# Patient Record
Sex: FEMALE | Race: BLACK OR AFRICAN AMERICAN | NOT HISPANIC OR LATINO | Employment: FULL TIME | URBAN - METROPOLITAN AREA
[De-identification: names, ages, dates, MRNs, and addresses within clinical notes are randomized per-mention and may not be internally consistent; named-entity substitution may affect disease eponyms.]

---

## 2017-03-29 ENCOUNTER — OFFICE VISIT (OUTPATIENT)
Dept: INTERNAL MEDICINE | Facility: CLINIC | Age: 37
End: 2017-03-29
Payer: COMMERCIAL

## 2017-03-29 VITALS
HEART RATE: 73 BPM | WEIGHT: 166.5 LBS | BODY MASS INDEX: 26.13 KG/M2 | TEMPERATURE: 98.2 F | SYSTOLIC BLOOD PRESSURE: 98 MMHG | DIASTOLIC BLOOD PRESSURE: 64 MMHG | HEIGHT: 67 IN | OXYGEN SATURATION: 99 %

## 2017-03-29 DIAGNOSIS — K64.4 EXTERNAL HEMORRHOIDS: Primary | ICD-10-CM

## 2017-03-29 PROCEDURE — 99213 OFFICE O/P EST LOW 20 MIN: CPT | Performed by: PHYSICIAN ASSISTANT

## 2017-03-29 NOTE — MR AVS SNAPSHOT
After Visit Summary   3/29/2017    Racquel Romeo    MRN: [id number]           Patient Information     Date Of Birth          1980        Visit Information        Provider Department      3/29/2017 10:15 AM Blanquita Carvalho PA-C; [employer] [facility]        Today's Diagnoses     External hemorrhoids    -  1      Care Instructions    [facility] [place] [phone]        Follow-ups after your visit        Additional Services     COLORECTAL SURGERY REFERRAL       Your provider has referred you to: [facility] [place] [phone]   [email or web address]    Referral Reason(s): Hemorrhoids  Special Concerns: None  This referral is: Urgent (24 - 72 hours)  It is OK to leave a message on patient's voicemail.    Please be aware that coverage of these services is subject to the terms and limitations of your health insurance plan.  Call member services at your health plan with any benefit or coverage questions.      Please bring the following with you to your appointment:    (1) Any X-Rays, CTs or MRIs which have been performed.  Contact the facility where they were done to arrange for  prior to your scheduled appointment.    (2) List of current medications  (3) This referral request   (4) Any documents/labs given to you for this referral                  Your next 10 appointments already scheduled     Apr 25, 2017 10:30 AM CDT   PHYSICAL with Benji Arias MD   [facility]    [address] [address] [phone]              Who to contact     If you have questions or need follow up information about today's clinic visit or your schedule please contact [facility] directly at [phone].  Normal or non-critical lab and imaging results will be communicated to you by  "MyChart, letter or phone within 4 business days after the clinic has received the results. If you do not hear from us within 7 days, please contact the clinic through [employer] or phone. If you have a critical or abnormal lab result, we will notify you by phone as soon as possible.  Submit refill requests through [employer] or call your pharmacy and they will forward the refill request to us. Please allow 3 business days for your refill to be completed.          Additional Information About Your Visit        [employer] Information     [employer] lets you send messages to your doctor, view your test results, renew your prescriptions, schedule appointments and more. To sign up, go to [email or web address] . Click on \"Log in\" on the left side of the screen, which will take you to the Welcome page. Then click on \"Sign up Now\" on the right side of the page.     You will be asked to enter the access code listed below, as well as some personal information. Please follow the directions to create your username and password.     Your access code is: [email or web address]  Expires: 2017 10:45 AM     Your access code will  in 90 days. If you need help or a new code, please call your [facility] clinic or [phone].        Care EveryWhere ID     This is your Care EveryWhere ID. This could be used by other organizations to access your [facility] medical records  [id number]        Your Vitals Were     Pulse Temperature Height Last Period Pulse Oximetry Breastfeeding?    73 98.2  F (36.8  C) (Oral) 5' 7\" (1.702 m) 2017 (Approximate) 99% No    BMI (Body Mass Index)                   26.08 kg/m2            Blood Pressure from Last 3 Encounters:   17 98/64   16 98/60   12/10/15 104/76    Weight from Last 3 Encounters:   17 166 lb 8 oz (75.5 kg)   16 169 lb 9.6 oz (76.9 kg)   12/10/15 169 lb 6.4 oz (76.8 kg)              We Performed the Following     COLORECTAL SURGERY REFERRAL          Today's Medication " Changes          These changes are accurate as of: 3/29/17 10:45 AM.  If you have any questions, ask your nurse or doctor.               Start taking these medicines.        Dose/Directions    hydrocortisone 2.5 % cream   Commonly known as:  ANUSOL-HC   Used for:  External hemorrhoids   Started by:  Blanquita Carvalho PA-C        Place rectally 2 times daily   Quantity:  30 g   Refills:  0            Where to get your medicines      These medications were sent to [address]  [address] [place] [address]     Phone:  [phone]     hydrocortisone 2.5 % cream                Primary Care Provider Office Phone # Fax #    Benji Arias [phone]       [facility] [address] [facility] [address]        Thank you!     Thank you for choosing [facility]  for your care. Our goal is always to provide you with excellent care. Hearing back from our patients is one way we can continue to improve our services. Please take a few minutes to complete the written survey that you may receive in the mail after your visit with us. Thank you!             Your Updated Medication List - Protect others around you: Learn how to safely use, store and throw away your medicines at www.disposemymeds.org.          This list is accurate as of: 3/29/17 10:45 AM.  Always use your most recent med list.                   Brand Name Dispense Instructions for use    Calcium Carb-Cholecalciferol 1000-800 MG-UNIT Tabs    CALCIUM 1000 + D    100 tablet    Take 1 tablet by mouth daily TAKE WITH FOOD, FOR BONE HEALTH AND LOW VITAMIN D ([facility])       cholecalciferol 38698 UNITS capsule    VITAMIN D3    40 capsule    TAKE ONE CAP EVERY OTHER WEEK, FOR VITAMIN D DEFICIENCY (1ST AND 15TH OF EACH MONTH) hold for one month, and restart in March of 2014.       ferrous sulfate Dried 160 (50 FE) MG tablet    SLOW  FE    100 tablet    Take 1 tablet by mouth 2 times daily (before meals) IRON SUPPLEMENT - PLEASE TAKE WITH 4 OZ OF OJ WITH PULP, SUBSTITUTION OF IRON SUPPLEMENT PERMITTED (SEPARATE FROM CALCIUM SUPPLEMENT BY AT LEAST 3 HRS)       hydrocortisone 2.5 % cream    ANUSOL-HC    30 g    Place rectally 2 times daily       ibuprofen 800 MG tablet    ADVIL/MOTRIN    20 tablet    Take 1 tablet by mouth every 8 hours as needed for pain.       levonorgestrel 20 MCG/24HR IUD    MIRENA     1 each by Intrauterine route once       norgestimate-ethinyl estradiol 0.25-35 MG-MCG per tablet    ORTHO-CYCLEN, SPRINTEC    84 tablet    Take 1 tablet by mouth daily       prenatal multivitamin  plus iron 27-0.8 MG Tabs per tablet     100 tablet    Take 1 tablet by mouth daily INDICATION: VITAMIN SUPPLEMENTATION

## 2017-03-29 NOTE — NURSING NOTE
"Chief Complaint   Patient presents with     Hemorrhoids       Initial BP 98/64  Pulse 73  Temp 98.2  F (36.8  C) (Oral)  Ht 5' 7\" (1.702 m)  Wt 166 lb 8 oz (75.5 kg)  LMP 03/01/2017 (Approximate)  SpO2 99%  Breastfeeding? No  BMI 26.08 kg/m2 Estimated body mass index is 26.08 kg/(m^2) as calculated from the following:    Height as of this encounter: 5' 7\" (1.702 m).    Weight as of this encounter: 166 lb 8 oz (75.5 kg).  Medication Reconciliation: complete   Rosalba Larose CMA      "

## 2017-03-29 NOTE — PROGRESS NOTES
"  SUBJECTIVE:                                                    Racquel Romeo is a 37 year old female who presents to clinic today for the following health issues:      Hemorrhoids      Duration: Chronic- worsening 2-3 weeks ago     Description:   Pain: YES   Itching: YES    Accompanying signs and symptoms:   Blood in stool: no   Changes in stool pattern: no     History (similar episodes/previous evaluation): None    Precipitating or alleviating factors: None    Therapies tried and outcome: no over the counter treatments used.     Has been doing sitz baths.   Would like to see someone for possible removal as she is having issues chronically.       -------------------------------------    Problem list and histories reviewed & adjusted, as indicated.  Additional history: as documented    Labs reviewed in EPIC    Reviewed and updated as needed this visit by clinical staff  Tobacco  Allergies  Med Hx  Surg Hx  Fam Hx  Soc Hx      Reviewed and updated as needed this visit by Provider         ROS:  Constitutional, , cardiovascular, pulmonary, gi and gu systems are negative, except as otherwise noted.    OBJECTIVE:                                                    BP 98/64  Pulse 73  Temp 98.2  F (36.8  C) (Oral)  Ht 5' 7\" (1.702 m)  Wt 166 lb 8 oz (75.5 kg)  LMP 03/01/2017 (Approximate)  SpO2 99%  Breastfeeding? No  BMI 26.08 kg/m2  Body mass index is 26.08 kg/(m^2).  GENERAL: healthy, alert and no distress  RESP: lungs clear to auscultation - no rales, rhonchi or wheezes  CV: regular rate and rhythm, normal S1 S2, no S3 or S4, no murmur, click or rub, no peripheral edema and peripheral pulses strong  ABDOMEN: soft, nontender, no hepatosplenomegaly, no masses and bowel sounds normal  RECTAL (female): normal sphincter tone, no rectal masses and large external hemorrhoid- no thrombosis     Diagnostic Test Results:  none      ASSESSMENT/PLAN:                                                            1. " External hemorrhoids    - hydrocortisone (ANUSOL-HC) 2.5 % cream; Place rectally 2 times daily  Dispense: 30 g; Refill: 0  - COLORECTAL SURGERY REFERRAL    See Patient Instructions    Blanquita Carvalho PA-C  [facility]

## 2017-08-05 ENCOUNTER — HEALTH MAINTENANCE LETTER (OUTPATIENT)
Age: 37
End: 2017-08-05

## 2017-10-13 ENCOUNTER — OFFICE VISIT (OUTPATIENT)
Dept: OBGYN | Facility: CLINIC | Age: 37
End: 2017-10-13
Payer: COMMERCIAL

## 2017-10-13 VITALS
DIASTOLIC BLOOD PRESSURE: 60 MMHG | BODY MASS INDEX: 24.26 KG/M2 | WEIGHT: 163.8 LBS | HEART RATE: 64 BPM | HEIGHT: 69 IN | SYSTOLIC BLOOD PRESSURE: 92 MMHG

## 2017-10-13 DIAGNOSIS — Z13.220 SCREENING CHOLESTEROL LEVEL: ICD-10-CM

## 2017-10-13 DIAGNOSIS — R42 DIZZINESS: ICD-10-CM

## 2017-10-13 DIAGNOSIS — Z11.3 SCREEN FOR STD (SEXUALLY TRANSMITTED DISEASE): ICD-10-CM

## 2017-10-13 DIAGNOSIS — Z00.00 ROUTINE GENERAL MEDICAL EXAMINATION AT A HEALTH CARE FACILITY: Primary | ICD-10-CM

## 2017-10-13 DIAGNOSIS — Z12.4 SCREENING FOR CERVICAL CANCER: ICD-10-CM

## 2017-10-13 LAB
BASOPHILS # BLD AUTO: 0 10E9/L (ref 0–0.2)
BASOPHILS NFR BLD AUTO: 0.2 %
DIFFERENTIAL METHOD BLD: NORMAL
EOSINOPHIL # BLD AUTO: 0.1 10E9/L (ref 0–0.7)
EOSINOPHIL NFR BLD AUTO: 1.6 %
ERYTHROCYTE [DISTWIDTH] IN BLOOD BY AUTOMATED COUNT: 13.4 % (ref 10–15)
HCT VFR BLD AUTO: 39 % (ref 35–47)
HGB BLD-MCNC: 12.5 G/DL (ref 11.7–15.7)
LYMPHOCYTES # BLD AUTO: 1.6 10E9/L (ref 0.8–5.3)
LYMPHOCYTES NFR BLD AUTO: 27.6 %
MCH RBC QN AUTO: 27.5 PG (ref 26.5–33)
MCHC RBC AUTO-ENTMCNC: 32.1 G/DL (ref 31.5–36.5)
MCV RBC AUTO: 86 FL (ref 78–100)
MONOCYTES # BLD AUTO: 0.5 10E9/L (ref 0–1.3)
MONOCYTES NFR BLD AUTO: 8.5 %
NEUTROPHILS # BLD AUTO: 3.5 10E9/L (ref 1.6–8.3)
NEUTROPHILS NFR BLD AUTO: 62.1 %
PLATELET # BLD AUTO: 242 10E9/L (ref 150–450)
RBC # BLD AUTO: 4.54 10E12/L (ref 3.8–5.2)
WBC # BLD AUTO: 5.6 10E9/L (ref 4–11)

## 2017-10-13 PROCEDURE — 85025 COMPLETE CBC W/AUTO DIFF WBC: CPT | Performed by: ADVANCED PRACTICE MIDWIFE

## 2017-10-13 PROCEDURE — 99395 PREV VISIT EST AGE 18-39: CPT | Performed by: ADVANCED PRACTICE MIDWIFE

## 2017-10-13 PROCEDURE — 82728 ASSAY OF FERRITIN: CPT | Performed by: ADVANCED PRACTICE MIDWIFE

## 2017-10-13 PROCEDURE — 87591 N.GONORRHOEAE DNA AMP PROB: CPT | Performed by: ADVANCED PRACTICE MIDWIFE

## 2017-10-13 PROCEDURE — 83540 ASSAY OF IRON: CPT | Performed by: ADVANCED PRACTICE MIDWIFE

## 2017-10-13 PROCEDURE — 36415 COLL VENOUS BLD VENIPUNCTURE: CPT | Performed by: ADVANCED PRACTICE MIDWIFE

## 2017-10-13 PROCEDURE — 80061 LIPID PANEL: CPT | Performed by: ADVANCED PRACTICE MIDWIFE

## 2017-10-13 PROCEDURE — 83550 IRON BINDING TEST: CPT | Performed by: ADVANCED PRACTICE MIDWIFE

## 2017-10-13 PROCEDURE — G0145 SCR C/V CYTO,THINLAYER,RESCR: HCPCS | Performed by: ADVANCED PRACTICE MIDWIFE

## 2017-10-13 PROCEDURE — 82306 VITAMIN D 25 HYDROXY: CPT | Performed by: ADVANCED PRACTICE MIDWIFE

## 2017-10-13 PROCEDURE — 87491 CHLMYD TRACH DNA AMP PROBE: CPT | Performed by: ADVANCED PRACTICE MIDWIFE

## 2017-10-13 PROCEDURE — 99213 OFFICE O/P EST LOW 20 MIN: CPT | Mod: 25 | Performed by: ADVANCED PRACTICE MIDWIFE

## 2017-10-13 NOTE — LETTER
October 20, 2017      Racquel Romeo  [address]      [place] [address]    Dear ,      I am happy to inform you that your recent cervical cancer screening test (PAP smear) was normal.      Preventative screenings such as this help to ensure your health for years to come. You should repeat a pap smear in 3 years, unless otherwise directed.      You will still need to return to the clinic every year for your annual exam and other preventive tests.     Please contact the clinic at [phone] if you have further questions.       Sincerely,      LEE ANN Conley CNM/amor

## 2017-10-13 NOTE — PROGRESS NOTES
"Racquel is a 37 year old  female who presents for annual exam.     Besides routine health maintenance,  she would like to discuss dizziness.  HPI:  Racquel is here for annual well woman exam.  Would like to discuss dizziness.  States that she becomes dizzy with walking and occasionally short of breath.  Resolve when she sits down.  States onset of dizziness when going from sitting to standing. Denies syncope, heart palpitations, chest pain, changes in vision.  Has been seen for this issue by PCP and was diagnosed with iron deficiency anemia.  Was prescribed iron and vitamin D.  Patient states she took the vitamins for a while and had a significant improvement in symptoms however stopped taking because she says the medication gave her headaches.  Is not currently taking any vitamins.  Would like a refill of vitamins at a \"lower dose\" to see if this will help with associated headaches.   Menses are regular q 28-30 days and normal lasting 5 days.   Menses flow: normal.    Patient's last menstrual period was 10/05/2017 (approximate)..   Using none for contraception.    She is not currently considering pregnancy.    REPRODUCTIVE/GYNECOLOGIC HISTORY:  Racquel is not sexually active.  STI testing offered?  Accepted  History of abnormal Pap smear:  No  SOCIAL HISTORY  Abuse: does not report having previously been physical or sexually abused.    Do you feel safe in your environment? YES    She  reports that she has never smoked. She has never used smokeless tobacco.        Last PHQ-9 score on record = PHQ-9 SCORE 2012   Total Score 0     Last GAD7 score on record = No flowsheet data found.  Alcohol Score = no    HEALTH MAINTENANCE:  Cholesterol: (  Cholesterol   Date Value Ref Range Status   2013 166 0 - 200 mg/dL Final     Comment:     LDL Cholesterol is the primary guide to therapy.   The NCEP recommends further evaluation of: patients with cholesterol greater   than 200 mg/dL if additional risk factors are " present, cholesterol greater   than   240 mg/dL, triglycerides greater than 150 mg/dL, or HDL less than 40 mg/dL.   2013 225 (H) 0 - 200 mg/dL Final     Comment:     LDL Cholesterol is the primary guide to therapy.   The NCEP recommends further evaluation of: patients with cholesterol greater   than 200 mg/dL if additional risk factors are present, cholesterol greater   than   240 mg/dL, triglycerides greater than 150 mg/dL, or HDL less than 40 mg/dL.    History of abnormal lipids: No  Mammo: no . History of abnormal Mammo: Not applicable.  Regular Self Breast Exams: Yes  TSH: (  TSH   Date Value Ref Range Status   12/10/2015 1.90 0.40 - 4.00 mU/L Final    )  Pap; (  Lab Results   Component Value Date    PAP NIL 2011    PAP NIL 2009    )  Immunizations up to date: unsure   (Gardasil, Tdap, Flu)  Health maintenance updated:  yes    HEALTHY HABITS  Eating habits: eats regular meals  Calcium/Vitamin D intake: source:  dairy, dietary supplement(s) Adequate?   Exercise: How often do you exercise? No  Have you had an eye exam in the last two years? NO (Recommended)  Do you routinely see the dentist once or twice yearly? YES      HISTORY:  Obstetric History       T5      L5     SAB0   TAB0   Ectopic0   Multiple0   Live Births5       # Outcome Date GA Lbr Alli/2nd Weight Sex Delivery Anes PTL Lv   6 Term 12 39w4d 05:32 / 00:09 7 lb 3 oz (3.26 kg) M Vag-Spont Local N RICH      Name: AGUEDA,CONNOR GALARZAUMO      Apgar1:  9                Apgar5: 9   5 Term 11 40w0d 01:00 7 lb (3.175 kg) M  None  RICH      Name: Agueda   4 AB 01/01/10 4w0d          3 Term 01 40w0d 01:00 4 lb (1.814 kg) M  None  RICH      Name: Abner   2 Term 99 40w0d 01:00 4 lb (1.814 kg) M  None  RICH      Name: Jericho   1 Term 10/10/98 40w0d 01:00 5 lb (2.268 kg) M  None  RICH      Name: Daphney        Past Medical History:   Diagnosis Date     Iron deficiency anemia      IUD (intrauterine  "device) in place     Mirena  4-30-13     Past Surgical History:   Procedure Laterality Date     NO HISTORY OF SURGERY       Family History   Problem Relation Age of Onset     Family History Negative Other      Social History     Social History     Marital status:      Spouse name: Kerrie     Number of children: 4     Years of education: N/A     Occupational History      None      Social History Main Topics     Smoking status: Never Smoker     Smokeless tobacco: Never Used     Alcohol use No     Drug use: No     Sexual activity: Yes     Partners: Male     Other Topics Concern     Blood Transfusions No     Seat Belt Yes     Social History Narrative    Living arrangements - the patient lives with her family.        Current Outpatient Prescriptions:      ibuprofen (ADVIL,MOTRIN) 800 MG tablet, Take 1 tablet by mouth every 8 hours as needed for pain., Disp: 20 tablet, Rfl: 0     Allergies   Allergen Reactions     No Known Drug Allergies        Past medical, surgical, social and family history were reviewed and updated in EPIC.    ROS:   C: NEGATIVE for fever, chills, change in weight  I: NEGATIVE for worrisome rashes, moles or lesions  E: NEGATIVE for vision changes or irritation  ENT: NEGATIVE for ear, mouth and throat problems  R: NEGATIVE for significant cough or SOB  B: NEGATIVE for masses, tenderness or discharge  CV: NEGATIVE for chest pain, palpitations or peripheral edema  GI: NEGATIVE for nausea, abdominal pain, heartburn, or change in bowel habits  : NEGATIVE for unusual urinary or vaginal symptoms. Periods are regular.  M: NEGATIVE for significant arthralgias or myalgia  N: NEGATIVE for weakness, dizziness or paresthesias  P: NEGATIVE for changes in mood or affect    PHYSICAL EXAM:  BP 92/60  Pulse 64  Ht 5' 8.5\" (1.74 m)  Wt 163 lb 12.8 oz (74.3 kg)  LMP 10/05/2017 (Approximate)  Breastfeeding? No  BMI 24.54 kg/m2   BMI: Body mass index is 24.54 kg/(m^2).  Constitutional: healthy, alert and " no distress  Neck: symmetrical, thyroid normal size, no masses present, no lymphadenopathy present.   Cardiovascular: RRR, no murmurs present  Respiratory: breathing unlabored, lungs CTA bilaterally  Breast:normal without masses, tenderness or nipple discharge and no palpable axillary masses or adenopathy  Gastrointestinal: abdomen soft, non-tender, bowel sounds present  PELVIC EXAM:  Vulva: No lesions, no adenopathy, BUS WNL  Vagina: Moist, pink, discharge normal  well rugated, no lesions  Cervix:smooth, pink, no visible lesions  Uterus: Normal size, non-tender, mobile  Ovaries: No masses palpated  Rectal exam: deferred    ASSESSMENT/PLAN:  (R42) Dizziness  (primary encounter diagnosis)  Comment: Patient has hx of iron deficiency anemia   Plan: CBC with platelets and differential, Vitamin D         Deficiency, Iron and iron binding capacity,         Ferritin        Will review results and provide appropriate follow up PRN    Strongly encouraged patient to follow up with PCP pending lab results.  Discussed importance of taking vitamins to help with dizziness.  If dizziness not explained by labs today or if dizziness does not improve with iron supplementation, patient needs to follow up with PCP to rule out other possible causes.     (Z00.00) Routine general medical examination at a health care facility  Comment: normal exam for age   Plan: return to clinic PRN or in 1 year     (Z12.4) Screening for cervical cancer  Comment: pending  Plan: PAP imaged thin layer screen        Will review results and provide appropriate follow up PRN      (Z11.3) Screen for STD (sexually transmitted disease)  Comment: pending   Plan: Neisseria gonorrhoeae PCR, Chlamydia         trachomatis PCR        Will review results and provide appropriate follow up PRN      (Z13.220) Screening cholesterol level  Comment: pending   Plan: **Lipid panel reflex to direct LDL FUTURE 1yr        Will review results and provide appropriate follow up PRN         Return to clinic 1 year for well woman exam.       COUNSELING:   Reviewed preventive health counseling, as reflected in patient instructions       Healthy diet/nutrition   reports that she has never smoked. She has never used smokeless tobacco.        LEE ANN Kellogg, NEGRITOM

## 2017-10-13 NOTE — NURSING NOTE
"Chief Complaint   Patient presents with     Gyn Exam     pap due       Initial BP 92/60  Pulse 64  Ht 5' 8.5\" (1.74 m)  Wt 163 lb 12.8 oz (74.3 kg)  LMP 10/05/2017 (Approximate)  Breastfeeding? No  BMI 24.54 kg/m2 Estimated body mass index is 24.54 kg/(m^2) as calculated from the following:    Height as of this encounter: 5' 8.5\" (1.74 m).    Weight as of this encounter: 163 lb 12.8 oz (74.3 kg).  BP completed using cuff size: regular        The following HM Due: NONE      The following patient reported/Care Every where data was sent to:  P ABSTRACT QUALITY INITIATIVES [[id number]]  NA     patient has appointment for today    Emily JEFFRIES               "

## 2017-10-13 NOTE — MR AVS SNAPSHOT
"              After Visit Summary   10/13/2017    Racquel Romeo    MRN: [id number]           Patient Information     Date Of Birth          1980        Visit Information        Provider Department      10/13/2017 8:45 AM Vanessa Breaux APRN CNM; [employer] [facility]        Today's Diagnoses     Routine general medical examination at a health care facility    -  1    Dizziness        Screening for cervical cancer        Screen for STD (sexually transmitted disease)        Screening cholesterol level           Follow-ups after your visit        Follow-up notes from your care team     Return in about 1 year (around 10/13/2018) for Physical Exam.      Who to contact     If you have questions or need follow up information about today's clinic visit or your schedule please contact [facility] directly at [phone].  Normal or non-critical lab and imaging results will be communicated to you by MyChart, letter or phone within 4 business days after the clinic has received the results. If you do not hear from us within 7 days, please contact the clinic through MyChart or phone. If you have a critical or abnormal lab result, we will notify you by phone as soon as possible.  Submit refill requests through [employer] or call your pharmacy and they will forward the refill request to us. Please allow 3 business days for your refill to be completed.          Additional Information About Your Visit        MyChart Information     [facility] lets you send messages to your doctor, view your test results, renew your prescriptions, schedule appointments and more. To sign up, go to [email or web address] . Click on \"Log in\" on the left side of the screen, which will take you to the Welcome page. Then click on \"Sign up Now\" on the right side of the page.     You will be asked to enter the access code listed below, as well as some personal information. Please follow the " "directions to create your username and password.     Your access code is: [email or web address]  Expires: 2018 10:19 AM     Your access code will  in 90 days. If you need help or a new code, please call your [facility] clinic or [phone].        Care EveryWhere ID     This is your Care EveryWhere ID. This could be used by other organizations to access your [facility] medical records  [id number]        Your Vitals Were     Pulse Height Last Period Breastfeeding? BMI (Body Mass Index)       64 5' 8.5\" (1.74 m) 10/05/2017 (Approximate) No 24.54 kg/m2        Blood Pressure from Last 3 Encounters:   10/13/17 92/60   17 98/64   16 98/60    Weight from Last 3 Encounters:   10/13/17 163 lb 12.8 oz (74.3 kg)   17 166 lb 8 oz (75.5 kg)   16 169 lb 9.6 oz (76.9 kg)              We Performed the Following     **Lipid panel reflex to direct LDL FUTURE 1yr     CBC with platelets and differential     Chlamydia trachomatis PCR     Ferritin     Iron and iron binding capacity     Neisseria gonorrhoeae PCR     PAP imaged thin layer screen     Vitamin D Deficiency          Today's Medication Changes          These changes are accurate as of: 10/13/17 10:19 AM.  If you have any questions, ask your nurse or doctor.               Stop taking these medicines if you haven't already. Please contact your care team if you have questions.     Calcium Carb-Cholecalciferol 1000-800 MG-UNIT Tabs   Commonly known as:  CALCIUM 1000 + D   Stopped by:  Vanessa Breaux APRN CNM           cholecalciferol 74956 UNITS capsule   Commonly known as:  VITAMIN D3   Stopped by:  Vanessa Breaux APRN CNM           ferrous sulfate Dried 160 (50 FE) MG tablet   Commonly known as:  SLOW FE   Stopped by:  Vanessa Breaux APRN CNM           hydrocortisone 2.5 % cream   Commonly known as:  ANUSOL-HC   Stopped by:  Vanessa Breaux APRN CNM           levonorgestrel 20 MCG/24HR IUD   Commonly known as:  MIRENA   Stopped by:  " Vanessa Breaux APRN CNM           norgestimate-ethinyl estradiol 0.25-35 MG-MCG per tablet   Commonly known as:  ORTHO-CYCLEN, SPRINTEC   Stopped by:  Vanessa Breaux APRN CNM           prenatal multivitamin plus iron 27-0.8 MG Tabs per tablet   Stopped by:  Vanessa Breaux APRN CNM                    Primary Care Provider Office Phone # Fax #    Benji Arias [phone]       [address] [place] [address]        Equal Access to Services     [facility]: Hadii aad ku hadasho Soomaali, waaxda luqadaha, qaybta kaalmada orly, mariza dias . So [facility] [phone].    ATENCIÓN: Si habla español, tiene a [place] disposición servicios gratuitos de asistencia lingüística. [facility] [phone].    We comply with applicable federal civil rights laws and [place] laws. We do not discriminate on the basis of race, color, national origin, age, disability, sex, sexual orientation, or gender identity.            Thank you!     Thank you for choosing [facility]  for your care. Our goal is always to provide you with excellent care. Hearing back from our patients is one way we can continue to improve our services. Please take a few minutes to complete the written survey that you may receive in the mail after your visit with us. Thank you!             Your Updated Medication List - Protect others around you: Learn how to safely use, store and throw away your medicines at www.disposemymeds.org.          This list is accurate as of: 10/13/17 10:19 AM.  Always use your most recent med list.                   Brand Name Dispense Instructions for use Diagnosis    ibuprofen 800 MG tablet    ADVIL/MOTRIN    20 tablet    Take 1 tablet by mouth every 8 hours as needed for pain.

## 2017-10-14 LAB
CHOLEST SERPL-MCNC: 183 MG/DL
FERRITIN SERPL-MCNC: 42 NG/ML (ref 12–150)
HDLC SERPL-MCNC: 48 MG/DL
IRON SATN MFR SERPL: 24 % (ref 15–46)
IRON SERPL-MCNC: 70 UG/DL (ref 35–180)
LDLC SERPL CALC-MCNC: 119 MG/DL
NONHDLC SERPL-MCNC: 135 MG/DL
TIBC SERPL-MCNC: 287 UG/DL (ref 240–430)
TRIGL SERPL-MCNC: 79 MG/DL

## 2017-10-15 LAB
C TRACH DNA SPEC QL NAA+PROBE: NEGATIVE
DEPRECATED CALCIDIOL+CALCIFEROL SERPL-MC: 20 UG/L (ref 20–75)
N GONORRHOEA DNA SPEC QL NAA+PROBE: NEGATIVE
SPECIMEN SOURCE: NORMAL
SPECIMEN SOURCE: NORMAL

## 2017-10-16 ENCOUNTER — TELEPHONE (OUTPATIENT)
Dept: OBGYN | Facility: CLINIC | Age: 37
End: 2017-10-16

## 2017-10-16 DIAGNOSIS — E55.9 VITAMIN D DEFICIENCY: Primary | ICD-10-CM

## 2017-10-16 RX ORDER — CHOLECALCIFEROL (VITAMIN D3) 50 MCG
2000 TABLET ORAL DAILY
Qty: 100 TABLET | Refills: 3 | Status: SHIPPED | OUTPATIENT
Start: 2017-10-16 | End: 2021-08-14

## 2017-10-16 NOTE — TELEPHONE ENCOUNTER
Tried calling patient with  services on the phone, phone rang and rang and then was disconnected. Unable to leave a voicemail. Will try calling back later.  Mame Jacob CMA

## 2017-10-16 NOTE — TELEPHONE ENCOUNTER
Rx for Vitamin D sent to patient pharmacy.  Patient requested lower dose of vitamin d/t headaches.  2000IU dose sent, can lower dose if headaches continue.  Will Refer patient to PCP for follow up for dizziness symptoms, unexplained by iron deficiency anemia labs. LEE ANN Kellogg, CNM

## 2017-10-16 NOTE — LETTER
[facility]  [address], [address]  [place] [address]  [phone]        October 18, 2017    Racquel Romeo  [address]      [facility] [address]            Dear MsJohn Romeo:    We have tried calling you regarding your recent request for Vitamin D. A  lower dose of vitamin D was sent due  to headaches.  2000IU dose sent, can lower dose if headaches continue.  I also recommend that you see your primary care physician  for follow up for dizziness symptoms, unexplained by iron deficiency anemia labs.         Sincerely,  LEE ANN Kellogg, NEGRITOM

## 2017-10-17 LAB
COPATH REPORT: NORMAL
PAP: NORMAL

## 2017-10-23 ENCOUNTER — TELEPHONE (OUTPATIENT)
Dept: INTERNAL MEDICINE | Facility: CLINIC | Age: 37
End: 2017-10-23

## 2017-10-23 DIAGNOSIS — E55.9 VITAMIN D DEFICIENCY: Primary | ICD-10-CM

## 2017-10-23 NOTE — PROGRESS NOTES
Labs are OK except for Vit D which is low and may cause fatigue, muscle aches and pains, and abnormal cholesterol panel. She should take vitamins as previously prescribed and establish care with a provider who can address her concerns. Her cholesterol panel is improved she is to continue low fat, high fiber diet and regular exercise.

## 2017-10-23 NOTE — TELEPHONE ENCOUNTER
MD AMY  P [facility]                   Labs are OK except for Vit D which is low and may cause fatigue, muscle aches and pains, and abnormal cholesterol panel. She should take vitamins as previously prescribed and establish care with a provider who can address her concerns. Her cholesterol panel is improved she is to continue low fat, high fiber diet and regular exercise.            Called patient unable to LM

## 2017-10-24 NOTE — TELEPHONE ENCOUNTER
Routing refill request to provider for review/approval because:  Medication not active on medication list.

## 2017-10-25 ENCOUNTER — TELEPHONE (OUTPATIENT)
Dept: INTERNAL MEDICINE | Facility: CLINIC | Age: 37
End: 2017-10-25

## 2017-10-25 DIAGNOSIS — R53.82 CHRONIC FATIGUE: ICD-10-CM

## 2017-10-26 NOTE — TELEPHONE ENCOUNTER
Patient is looking for a refill on her slow release iron 45 mg - it is not on her active med list.  Please send a new order to the [facility].    Thank you,    Artis Lezama, PharmD  [facility]  [phone]

## 2018-07-31 ENCOUNTER — TELEPHONE (OUTPATIENT)
Dept: BEHAVIORAL HEALTH | Facility: CLINIC | Age: 38
End: 2018-07-31

## 2021-05-30 ENCOUNTER — RECORDS - HEALTHEAST (OUTPATIENT)
Dept: ADMINISTRATIVE | Facility: CLINIC | Age: 41
End: 2021-05-30

## 2021-08-14 ENCOUNTER — OFFICE VISIT (OUTPATIENT)
Dept: URGENT CARE | Facility: URGENT CARE | Age: 41
End: 2021-08-14
Payer: COMMERCIAL

## 2021-08-14 VITALS
SYSTOLIC BLOOD PRESSURE: 96 MMHG | OXYGEN SATURATION: 98 % | TEMPERATURE: 97.8 F | DIASTOLIC BLOOD PRESSURE: 62 MMHG | WEIGHT: 173.8 LBS | BODY MASS INDEX: 26.04 KG/M2 | HEART RATE: 74 BPM

## 2021-08-14 DIAGNOSIS — R10.2 PELVIC PAIN IN FEMALE: ICD-10-CM

## 2021-08-14 DIAGNOSIS — R10.84 ABDOMINAL PAIN, GENERALIZED: Primary | ICD-10-CM

## 2021-08-14 DIAGNOSIS — Z32.00 POSSIBLE PREGNANCY, NOT CONFIRMED: ICD-10-CM

## 2021-08-14 LAB
ALBUMIN SERPL-MCNC: 3.6 G/DL (ref 3.4–5)
ALBUMIN UR-MCNC: NEGATIVE MG/DL
ALP SERPL-CCNC: 46 U/L (ref 40–150)
ALT SERPL W P-5'-P-CCNC: 17 U/L (ref 0–50)
ANION GAP SERPL CALCULATED.3IONS-SCNC: 2 MMOL/L (ref 3–14)
APPEARANCE UR: CLEAR
AST SERPL W P-5'-P-CCNC: 9 U/L (ref 0–45)
BACTERIA #/AREA URNS HPF: ABNORMAL /HPF
BASOPHILS # BLD AUTO: 0 10E3/UL (ref 0–0.2)
BASOPHILS NFR BLD AUTO: 0 %
BILIRUB SERPL-MCNC: 0.3 MG/DL (ref 0.2–1.3)
BILIRUB UR QL STRIP: NEGATIVE
BUN SERPL-MCNC: 12 MG/DL (ref 7–30)
CALCIUM SERPL-MCNC: 8.9 MG/DL (ref 8.5–10.1)
CHLORIDE BLD-SCNC: 105 MMOL/L (ref 94–109)
CO2 SERPL-SCNC: 29 MMOL/L (ref 20–32)
COLOR UR AUTO: YELLOW
CREAT SERPL-MCNC: 0.77 MG/DL (ref 0.52–1.04)
EOSINOPHIL # BLD AUTO: 0.1 10E3/UL (ref 0–0.7)
EOSINOPHIL NFR BLD AUTO: 2 %
ERYTHROCYTE [DISTWIDTH] IN BLOOD BY AUTOMATED COUNT: 14.6 % (ref 10–15)
GFR SERPL CREATININE-BSD FRML MDRD: >90 ML/MIN/1.73M2
GLUCOSE BLD-MCNC: 85 MG/DL (ref 70–99)
GLUCOSE UR STRIP-MCNC: NEGATIVE MG/DL
HCG UR QL: NEGATIVE
HCT VFR BLD AUTO: 37.4 % (ref 35–47)
HGB BLD-MCNC: 11.7 G/DL (ref 11.7–15.7)
HGB UR QL STRIP: ABNORMAL
KETONES UR STRIP-MCNC: NEGATIVE MG/DL
LEUKOCYTE ESTERASE UR QL STRIP: NEGATIVE
LYMPHOCYTES # BLD AUTO: 1.9 10E3/UL (ref 0.8–5.3)
LYMPHOCYTES NFR BLD AUTO: 32 %
MCH RBC QN AUTO: 26.2 PG (ref 26.5–33)
MCHC RBC AUTO-ENTMCNC: 31.3 G/DL (ref 31.5–36.5)
MCV RBC AUTO: 84 FL (ref 78–100)
MONOCYTES # BLD AUTO: 0.5 10E3/UL (ref 0–1.3)
MONOCYTES NFR BLD AUTO: 8 %
NEUTROPHILS # BLD AUTO: 3.5 10E3/UL (ref 1.6–8.3)
NEUTROPHILS NFR BLD AUTO: 58 %
NITRATE UR QL: NEGATIVE
PH UR STRIP: 5.5 [PH] (ref 5–7)
PLATELET # BLD AUTO: 283 10E3/UL (ref 150–450)
POTASSIUM BLD-SCNC: 3.8 MMOL/L (ref 3.4–5.3)
PROT SERPL-MCNC: 8 G/DL (ref 6.8–8.8)
RBC # BLD AUTO: 4.47 10E6/UL (ref 3.8–5.2)
RBC #/AREA URNS AUTO: ABNORMAL /HPF
SODIUM SERPL-SCNC: 136 MMOL/L (ref 133–144)
SP GR UR STRIP: >=1.03 (ref 1–1.03)
SQUAMOUS #/AREA URNS AUTO: ABNORMAL /LPF
UROBILINOGEN UR STRIP-ACNC: 0.2 E.U./DL
WBC # BLD AUTO: 6 10E3/UL (ref 4–11)
WBC #/AREA URNS AUTO: ABNORMAL /HPF

## 2021-08-14 PROCEDURE — 87086 URINE CULTURE/COLONY COUNT: CPT | Performed by: PHYSICIAN ASSISTANT

## 2021-08-14 PROCEDURE — 36415 COLL VENOUS BLD VENIPUNCTURE: CPT | Performed by: PHYSICIAN ASSISTANT

## 2021-08-14 PROCEDURE — 81025 URINE PREGNANCY TEST: CPT | Performed by: PHYSICIAN ASSISTANT

## 2021-08-14 PROCEDURE — 85025 COMPLETE CBC W/AUTO DIFF WBC: CPT | Performed by: PHYSICIAN ASSISTANT

## 2021-08-14 PROCEDURE — 99215 OFFICE O/P EST HI 40 MIN: CPT | Performed by: PHYSICIAN ASSISTANT

## 2021-08-14 PROCEDURE — 80053 COMPREHEN METABOLIC PANEL: CPT | Performed by: PHYSICIAN ASSISTANT

## 2021-08-14 PROCEDURE — 81001 URINALYSIS AUTO W/SCOPE: CPT | Performed by: PHYSICIAN ASSISTANT

## 2021-08-14 RX ORDER — NAPROXEN 500 MG/1
500 TABLET ORAL 2 TIMES DAILY WITH MEALS
Qty: 30 TABLET | Refills: 3 | Status: SHIPPED | OUTPATIENT
Start: 2021-08-14 | End: 2022-08-14

## 2021-08-14 NOTE — PROGRESS NOTES
Assessment & Plan     Abdominal pain, generalized  Lower abdominal cramps, pelvis  UA negative for pregnancy, ectopic pregnancy  CBC negative  CMP normal  Urine culture pending  Patient declines STD testing  - UA with Microscopic reflex to Culture - Clinic Collect  - UA with Microscopic reflex to Culture  - CBC with platelets and differential; Future  - Comprehensive metabolic panel (BMP + Alb, Alk Phos, ALT, AST, Total. Bili, TP); Future  - Comprehensive metabolic panel (BMP + Alb, Alk Phos, ALT, AST, Total. Bili, TP)  - CBC with platelets and differential  - Urine Microscopic  - Urine Culture    Possible pregnancy, not confirmed  Urine HCG neg  - UA with Microscopic reflex to Culture  - HCG Qual, Urine ([id number])    Pelvic pain in female  Naprosyn  Follow up with PCP next week, consider pelvic ultrasound if symptoms persist  - naproxen (NAPROSYN) 500 MG tablet; Take 1 tablet (500 mg) by mouth 2 times daily (with meals)    Review of external notes as documented elsewhere in note     > 40 minutes spent on the date of the encounter doing chart review, review of outside records, review of test results, interpretation of tests, patient visit, documentation and discussion with other provider(s)     Follow up with PCP next week for recheck  Go to the ED if symptoms worsen    Macho Alexander PA-C  [facility] PARAG Clement is a 41 year old who presents for the following health issues     HPI     1 week  Pelvic cramps and tenderness  Lower abdominal area    Review of Systems   Constitutional, HEENT, cardiovascular, pulmonary, GI, , musculoskeletal, neuro, skin, endocrine and psych systems are negative, except as otherwise noted.      Objective    BP 96/62   Pulse 74   Temp 97.8  F (36.6  C) (Tympanic)   Wt 78.8 kg (173 lb 12.8 oz)   LMP 08/03/2021   SpO2 98%   BMI 26.04 kg/m    Body mass index is 26.04 kg/m .  Physical Exam   GENERAL: healthy, alert and no distress  EYES: Eyes  grossly normal to inspection, PERRL and conjunctivae and sclerae normal  HENT: ear canals and TM's normal, nose and mouth without ulcers or lesions  NECK: no adenopathy, no asymmetry, masses, or scars and thyroid normal to palpation  RESP: lungs clear to auscultation - no rales, rhonchi or wheezes  CV: regular rate and rhythm, normal S1 S2, no S3 or S4, no murmur, click or rub, no peripheral edema and peripheral pulses strong  ABDOMEN: tenderness lower abdomen   (female): deferred  MS: no gross musculoskeletal defects noted, no edema  SKIN: no suspicious lesions or rashes  NEURO: Normal strength and tone, mentation intact and speech normal  PSYCH: mentation appears normal, affect normal/bright    Results for orders placed or performed in visit on 08/14/21   UA with Microscopic reflex to Culture - Clinic Collect     Status: Abnormal    Specimen: Urine, Clean Catch   Result Value Ref Range    Color Urine Yellow Colorless, Straw, Light Yellow, Yellow    Appearance Urine Clear Clear    Glucose Urine Negative Negative mg/dL    Bilirubin Urine Negative Negative    Ketones Urine Negative Negative mg/dL    Specific Gravity Urine >=1.030 1.003 - 1.035    Blood Urine Small (A) Negative    pH Urine 5.5 5.0 - 7.0    Protein Albumin Urine Negative Negative mg/dL    Urobilinogen Urine 0.2 0.2, 1.0 E.U./dL    Nitrite Urine Negative Negative    Leukocyte Esterase Urine Negative Negative   HCG Qual, Urine ([id number])     Status: Normal   Result Value Ref Range    hCG Urine Qualitative Negative Negative   Comprehensive metabolic panel (BMP + Alb, Alk Phos, ALT, AST, Total. Bili, TP)     Status: Abnormal   Result Value Ref Range    Sodium 136 133 - 144 mmol/L    Potassium 3.8 3.4 - 5.3 mmol/L    Chloride 105 94 - 109 mmol/L    Carbon Dioxide (CO2) 29 20 - 32 mmol/L    Anion Gap 2 (L) 3 - 14 mmol/L    Urea Nitrogen 12 7 - 30 mg/dL    Creatinine 0.77 0.52 - 1.04 mg/dL    Calcium 8.9 8.5 - 10.1 mg/dL    Glucose 85 70 - 99 mg/dL     Alkaline Phosphatase 46 40 - 150 U/L    AST 9 0 - 45 U/L    ALT 17 0 - 50 U/L    Protein Total 8.0 6.8 - 8.8 g/dL    Albumin 3.6 3.4 - 5.0 g/dL    Bilirubin Total 0.3 0.2 - 1.3 mg/dL    GFR Estimate >90 >60 mL/min/1.73m2   CBC with platelets and differential     Status: Abnormal    Narrative    The following orders were created for panel order CBC with platelets and differential.  Procedure                               Abnormality         Status                     ---------                               -----------         ------                     CBC with platelets and d...[[id number]]  Abnormal            Final result                 Please view results for these tests on the individual orders.   Urine Microscopic     Status: Abnormal   Result Value Ref Range    Bacteria Urine None Seen None Seen /HPF    RBC Urine 0-2 0-2 /HPF /HPF    WBC Urine 0-5 0-5 /HPF /HPF    Squamous Epithelials Urine Few (A) None Seen /LPF    Narrative    Urine Culture not indicated   CBC with platelets and differential     Status: Abnormal   Result Value Ref Range    WBC Count 6.0 4.0 - 11.0 10e3/uL    RBC Count 4.47 3.80 - 5.20 10e6/uL    Hemoglobin 11.7 11.7 - 15.7 g/dL    Hematocrit 37.4 35.0 - 47.0 %    MCV 84 78 - 100 fL    MCH 26.2 (L) 26.5 - 33.0 pg    MCHC 31.3 (L) 31.5 - 36.5 g/dL    RDW 14.6 10.0 - 15.0 %    Platelet Count 283 150 - 450 10e3/uL    % Neutrophils 58 %    % Lymphocytes 32 %    % Monocytes 8 %    % Eosinophils 2 %    % Basophils 0 %    Absolute Neutrophils 3.5 1.6 - 8.3 10e3/uL    Absolute Lymphocytes 1.9 0.8 - 5.3 10e3/uL    Absolute Monocytes 0.5 0.0 - 1.3 10e3/uL    Absolute Eosinophils 0.1 0.0 - 0.7 10e3/uL    Absolute Basophils 0.0 0.0 - 0.2 10e3/uL

## 2021-08-16 LAB — BACTERIA UR CULT: NO GROWTH

## 2023-11-25 ENCOUNTER — HOSPITAL ENCOUNTER (EMERGENCY)
Facility: CLINIC | Age: 43
Discharge: HOME | End: 2023-11-25
Attending: EMERGENCY MEDICINE | Admitting: EMERGENCY MEDICINE
Payer: COMMERCIAL

## 2023-11-25 ENCOUNTER — APPOINTMENT (OUTPATIENT)
Dept: ULTRASOUND IMAGING | Facility: CLINIC | Age: 43
End: 2023-11-25
Attending: EMERGENCY MEDICINE
Payer: COMMERCIAL

## 2023-11-25 VITALS
DIASTOLIC BLOOD PRESSURE: 94 MMHG | SYSTOLIC BLOOD PRESSURE: 203 MMHG | HEART RATE: 77 BPM | BODY MASS INDEX: 25.1 KG/M2 | RESPIRATION RATE: 16 BRPM | HEIGHT: 64 IN | TEMPERATURE: 98.2 F | WEIGHT: 147 LBS | OXYGEN SATURATION: 98 %

## 2023-11-25 DIAGNOSIS — M71.22 BAKER'S CYST OF KNEE, LEFT: ICD-10-CM

## 2023-11-25 DIAGNOSIS — M79.662 PAIN OF LEFT LOWER LEG: ICD-10-CM

## 2023-11-25 PROCEDURE — 93971 EXTREMITY STUDY: CPT | Mod: LT

## 2023-11-25 PROCEDURE — 250N000013 HC RX MED GY IP 250 OP 250 PS 637: Performed by: EMERGENCY MEDICINE

## 2023-11-25 PROCEDURE — 99284 EMERGENCY DEPT VISIT MOD MDM: CPT | Mod: 25

## 2023-11-25 RX ORDER — CYCLOBENZAPRINE HCL 10 MG
10 TABLET ORAL ONCE
Status: COMPLETED | OUTPATIENT
Start: 2023-11-25 | End: 2023-11-25

## 2023-11-25 RX ORDER — ACETAMINOPHEN 325 MG/1
650 TABLET ORAL ONCE
Status: COMPLETED | OUTPATIENT
Start: 2023-11-25 | End: 2023-11-25

## 2023-11-25 RX ORDER — IBUPROFEN 600 MG/1
600 TABLET, FILM COATED ORAL ONCE
Status: COMPLETED | OUTPATIENT
Start: 2023-11-25 | End: 2023-11-25

## 2023-11-25 RX ORDER — CYCLOBENZAPRINE HCL 10 MG
10 TABLET ORAL 3 TIMES DAILY PRN
Qty: 20 TABLET | Refills: 0 | Status: SHIPPED | OUTPATIENT
Start: 2023-11-25

## 2023-11-25 RX ORDER — IBUPROFEN 600 MG/1
600 TABLET, FILM COATED ORAL EVERY 6 HOURS PRN
Qty: 20 TABLET | Refills: 0 | Status: SHIPPED | OUTPATIENT
Start: 2023-11-25

## 2023-11-25 RX ORDER — ACETAMINOPHEN 325 MG/1
650 TABLET ORAL EVERY 6 HOURS PRN
Qty: 30 TABLET | Refills: 0 | Status: SHIPPED | OUTPATIENT
Start: 2023-11-25

## 2023-11-25 RX ADMIN — IBUPROFEN 600 MG: 600 TABLET ORAL at 14:49

## 2023-11-25 RX ADMIN — CYCLOBENZAPRINE 10 MG: 10 TABLET, FILM COATED ORAL at 14:47

## 2023-11-25 RX ADMIN — ACETAMINOPHEN 650 MG: 325 TABLET ORAL at 14:49

## 2023-11-25 ASSESSMENT — ACTIVITIES OF DAILY LIVING (ADL): ADLS_ACUITY_SCORE: 35

## 2023-11-25 NOTE — ED PROVIDER NOTES
EMERGENCY DEPARTMENT ENCOUNTER      NAME: Angella Huff  AGE: 43 year old female  YOB: 1980  MRN: [id number]  EVALUATION DATE & TIME: 2023  1:53 PM    PCP: No primary care provider on file.    ED PROVIDER: Tan Olivera M.D.      Chief Complaint   Patient presents with    Leg Pain         FINAL IMPRESSION:  1. Pain of left lower leg    2. Baker's cyst of knee, left          ED COURSE & MEDICAL DECISION MAKIN year old female presents to the Emergency Department for evaluation of left lower leg pain.  Patient presenting with left posterior leg pain without any trauma.  Ultrasound reveals no evidence of DVT but does suggest a couple of Baker's cyst and some fluid in this area.  I think a ruptured Baker's cyst would be consistent with her history.  No bony tenderness or trauma to raise concern for fracture or significant occasion for plain films.  Otherwise she appears well, no evidence of infection, septic arthritis, etc.  I think her exam and history seem consistent with a suggested ruptured Baker's cyst and we can treat this conservatively.  Discussed this plan with the patient and her family member at the bedside and she was in agreement.    At the conclusion of the encounter I discussed the results of all of the tests and the disposition. The questions were answered. The patient or family acknowledged understanding and was agreeable with the care plan.       Medical Decision Making    History:  Supplemental history from: Family Member/Significant Other  External Record(s) reviewed: Outpatient Record: [facility] on 2023    Work Up:  Chart documentation includes differential considered and any EKGs or imaging independently interpreted by provider, where specified.  In additional to work up documented, I considered the following work up: Documented in chart, if applicable.    External consultation:  Discussion of management with another provider: Documented in  chart, if applicable    Complicating factors:  Care impacted by chronic illness: Hypertension  Care affected by social determinants of health: N/A    Disposition considerations: Discharge. I prescribed additional prescription strength medication(s) as charted. See documentation for any additional details.            MEDICATIONS GIVEN IN THE EMERGENCY:  Medications   acetaminophen (TYLENOL) tablet 650 mg (650 mg Oral $Given 11/25/23 1449)   ibuprofen (ADVIL/MOTRIN) tablet 600 mg (600 mg Oral $Given 11/25/23 1449)   cyclobenzaprine (FLEXERIL) tablet 10 mg (10 mg Oral $Given 11/25/23 1447)       NEW PRESCRIPTIONS STARTED AT TODAY'S ER VISIT  Discharge Medication List as of 11/25/2023  3:28 PM        START taking these medications    Details   acetaminophen (TYLENOL) 325 MG tablet Take 2 tablets (650 mg) by mouth every 6 hours as needed for mild pain, Disp-30 tablet, R-0, Local Print      cyclobenzaprine (FLEXERIL) 10 MG tablet Take 1 tablet (10 mg) by mouth 3 times daily as needed for muscle spasms, Disp-20 tablet, R-0, Local Print      ibuprofen (ADVIL/MOTRIN) 600 MG tablet Take 1 tablet (600 mg) by mouth every 6 hours as needed for moderate pain, Disp-20 tablet, R-0, Local Print                =================================================================    HPI    Patient information was obtained from: patient     Use of : Yes (In Person) - Language Anjali JACKSON Daria is a 43 year old female with a pertinent history of hypertension who presents to this ED via walk-in for evaluation of leg pain.    For the past week, patient has been having sharp pain on the lateral and posterior aspects of her entire left leg, particularly in the knee. She has had no recent injury that could've spurred this on, but she has had issues with her leg in the past. Today, she took no medication for the pain, and she has back pain that is not related. She can bend the leg to the side but bending it straight up and down  "is painful. She states that she can feel that it is not a muscle.    Per chart review, the patient presented to [facility] on 08-Jun-2023 for evaluation of left knee pain. Fell down stairs a year prior to visit with left knee pain and left toe pain persisting since then. Review of prior xray findings shows \"Moderate degenerative change involving the left knee with medial compartment narrowing.\" Determined to be consistent with osteoarthritis of the left knee.    REVIEW OF SYSTEMS   All systems reviewed and negative except as noted in HPI.    PAST MEDICAL HISTORY:  History reviewed. No pertinent past medical history.    PAST SURGICAL HISTORY:  History reviewed. No pertinent surgical history.        CURRENT MEDICATIONS:    No current facility-administered medications for this encounter.     Current Outpatient Medications   Medication    acetaminophen (TYLENOL) 325 MG tablet    cyclobenzaprine (FLEXERIL) 10 MG tablet    ibuprofen (ADVIL/MOTRIN) 600 MG tablet    azithromycin (ZITHROMAX) 250 MG tablet         ALLERGIES:  No Known Allergies    FAMILY HISTORY:  History reviewed. No pertinent family history.    SOCIAL HISTORY:   Social History     Socioeconomic History    Marital status:    Tobacco Use    Smoking status: Never       VITALS:  BP (!) 203/94   Pulse 77   Temp 98.2  F (36.8  C) (Oral)   Resp 16   Ht 1.626 m (5' 4\")   Wt 66.7 kg (147 lb)   SpO2 98%   BMI 25.23 kg/m      PHYSICAL EXAM    Constitutional: Well developed, Well nourished, NAD.  HENT: Normocephalic, Atraumatic. Neck Supple.  Eyes: EOMI, Conjunctiva normal.  Respiratory: Breathing comfortably on room air. Speaks full sentences easily. Lungs clear to ascultation.  Cardiovascular: Normal heart rate, Regular rhythm. No peripheral edema.  Abdomen: Soft, nontender  Musculoskeletal: Left lower extremity is without any visible asymmetric swelling warmth or erythema.  Normal range of motion of the left hip, left knee, left " ankle.  Integument: Warm, Dry.  Neurologic: Alert & awake, Normal motor function, Normal sensory function, No focal deficits noted.   Psychiatric: Cooperative. Affect appropriate.         RADIOLOGY:  Reviewed all pertinent imaging. Please see official radiology report.  US Lower Extremity Venous Duplex Left   Final Result   IMPRESSION:   1.  No deep venous thrombosis in the left lower extremity.   2.  Trace joint effusion/free fluid in the lateral knee under the area of pain. Two small fluid collections in the medial knee, likely Baker's cysts.              I, Clemente Harris, am serving as a scribe to document services personally performed by Dr. Tan Olivera, based on my observation and the provider's statements to me. I, Tan Olivera MD attest that Clemente Harris is acting in a scribe capacity, has observed my performance of the services and has documented them in accordance with my direction.    Tan Olivera M.D.  Emergency Medicine  [facility] EMERGENCY ROOM  [address] [facility] [id number] [phone]  Dept: [phone]       Tan Olivera MD  11/26/23 4125

## 2023-11-25 NOTE — DISCHARGE INSTRUCTIONS
You were seen in the emergency department for left lower leg pain.  Your evaluation has included an ultrasound.  This was negative for blood clots, did show a couple of small cyst behind your knee and a little bit of fluid which we think could be related to a ruptured cyst.  This can cause pain but should get better with time.  We would recommend keeping the leg elevated to help with pain and swelling.  We are going to prescribe you medications including Tylenol and ibuprofen as well as a muscle relaxer which should help with pain.  We would suggest following up with your clinic but we would be hopeful that symptoms will improve over the next couple of weeks and not require any other specific treatments.

## 2023-11-25 NOTE — ED TRIAGE NOTES
Patient has sharp left leg pain 10/10 for 1 week. Denies swelling or discoloration. Pain primarily noted to be lateral leg. Has not taken any medications today for pain.

## 2024-05-02 PROCEDURE — 99283 EMERGENCY DEPT VISIT LOW MDM: CPT

## 2024-05-02 PROCEDURE — 12031 INTMD RPR S/A/T/EXT 2.5 CM/<: CPT

## 2024-05-03 ENCOUNTER — HOSPITAL ENCOUNTER (EMERGENCY)
Facility: CLINIC | Age: 44
Discharge: HOME | End: 2024-05-03
Attending: EMERGENCY MEDICINE | Admitting: EMERGENCY MEDICINE
Payer: COMMERCIAL

## 2024-05-03 VITALS
TEMPERATURE: 98.2 F | DIASTOLIC BLOOD PRESSURE: 70 MMHG | RESPIRATION RATE: 18 BRPM | SYSTOLIC BLOOD PRESSURE: 110 MMHG | HEART RATE: 79 BPM | OXYGEN SATURATION: 99 %

## 2024-05-03 DIAGNOSIS — S61.411A LACERATION OF RIGHT HAND WITHOUT FOREIGN BODY, INITIAL ENCOUNTER: ICD-10-CM

## 2024-05-03 PROCEDURE — 250N000009 HC RX 250: Performed by: EMERGENCY MEDICINE

## 2024-05-03 RX ADMIN — Medication: at 01:17

## 2024-05-03 RX ADMIN — Medication: at 03:54

## 2024-05-03 ASSESSMENT — ACTIVITIES OF DAILY LIVING (ADL)
ADLS_ACUITY_SCORE: 37

## 2024-05-03 NOTE — ED TRIAGE NOTES
Pt presents to triage with son; pt was picking up some broken glass at home and sustained a laceration on right hand/palm. Bleeding is controlled on arrival but still oozing. Incident happened right before pt arrived in ED.      Triage Assessment (Adult)       Row Name 05/03/24 0004          Triage Assessment    Airway WDL WDL        Respiratory WDL    Respiratory WDL WDL        Skin Circulation/Temperature WDL    Skin Circulation/Temperature WDL WDL        Cardiac WDL    Cardiac WDL WDL        Peripheral/Neurovascular WDL    Peripheral Neurovascular WDL WDL        Cognitive/Neuro/Behavioral WDL    Cognitive/Neuro/Behavioral WDL WDL

## 2024-05-03 NOTE — ED PROVIDER NOTES
History     Chief Complaint:  Laceration (Right hand/palm)       HPI   Racquel Romeo is a 44 year old female who presents with a right hand laceration. Patient notes that her right hand was cut after encountering a broken glass table. Patient adamantly states that she did not see any glass in her hand.       Independent Historian:    None    Records Reviewed:      [facility] shows pt up to date on tetanus shot    Medications:    The patient is currently on no regular medications.    Past Medical History:    Hypercholesteremia  Hyperlipidemia  Iron deficiency anemia    Physical Exam   Patient Vitals for the past 24 hrs:   BP Temp Temp src Pulse Resp SpO2   05/03/24 0003 110/70 98.2  F (36.8  C) Oral 79 18 99 %        Physical Exam  General:  Alert, nontoxic in appearance  CV:  Appears well perfused  Lungs:  No obvious respiratory distress  Neuro:  Speaking clearly, no slurred speech  MSK:  2.5 centimeter laceration to the right thenar eminence.  No glass or other foreign bodies noted.  Normal strength, sensation, cap refill, range of motion of the fingers distally.      Emergency Department Course     Imaging:  No orders to display       Laboratory:  Labs Ordered and Resulted from Time of ED Arrival to Time of ED Departure - No data to display     Procedures     Laceration Repair      Procedure: Laceration Repair    Indication: Laceration    Consent: Verbal    Tetanus status reviewed    Location: Right Hand     Length: 2 cm    Preparation: Irrigation with Sterile Saline.    Anesthesia/Sedation: Topical -LET      Treatment/Exploration: Wound explored, no foreign bodies found     Closure: The wound was closed with two layers. Skin/superficial layer was closed with 4 x 4-0 Polyglactin rapide (vicryl) absorbable  using Interrupted sutures. Subcutaneous layer was closed with 3 x 2-0 and 4-0 Vicryl using Interrupted sutures.     Patient Status: The patient tolerated the procedure well: Yes. There were no complications.      Emergency Department Course & Assessments:    Interventions:  Medications   lido-EPINEPHrine-tetracaine (LET) topical gel GEL ( Topical $Given 5/3/24 4024)        Assessments:  0355 I obtained history and examined the patient as noted above    Independent Interpretation (X-rays, CTs, rhythm strip):  None    Consultations/Discussion of Management or Tests:  None       Social Determinants of Health affecting care:  None     Disposition:  The patient was discharged.    Impression & Plan    CMS Diagnoses: None       Medical Decision Making:  Racquel Romeo presents with a laceration to her right hand.  On my evaluation, I did not see any signs of foreign body, the patient was very clear that she does not believe that there is any foreign body.  I discussed doing an x-ray, but the patient did not feel this was necessary.  Wound was thoroughly cleaned and sutured closed with good effect.  I instructed her to monitor for any signs of infection and to return to the ER for any further concerns.    Diagnosis:    ICD-10-CM    1. Laceration of right hand without foreign body, initial encounter  S61.411A            Discharge Medications:  There are no discharge medications for this patient.         Scribe Disclosure:  I, Aleix Quiles, am serving as a scribe at 4:04 AM on 5/3/2024 to document services personally performed by Artis Roth MD based on my observations and the provider's statements to me.  5/3/2024   Artis Roth MD Bergenstal, John A, MD  05/03/24 4220

## 2024-05-03 NOTE — DISCHARGE INSTRUCTIONS
Your sutures should dissolve on their own over the next 10 to 12 days.  Please monitor for any signs of infection.

## 2024-05-03 NOTE — ED NOTES
Wound was cleaned thoroughly with cleanser and saline,no foreign body seen,tolerated procedure-applied dressing-awaits EDMD for suturing.

## 2024-05-19 ENCOUNTER — OFFICE VISIT (OUTPATIENT)
Dept: URGENT CARE | Facility: URGENT CARE | Age: 44
End: 2024-05-19
Payer: COMMERCIAL

## 2024-05-19 VITALS — TEMPERATURE: 98.3 F

## 2024-05-19 DIAGNOSIS — S61.411D LACERATION OF RIGHT HAND, FOREIGN BODY PRESENCE UNSPECIFIED, SUBSEQUENT ENCOUNTER: Primary | ICD-10-CM

## 2024-05-19 PROCEDURE — 99207 PR NO CHARGE LOS: CPT | Performed by: INTERNAL MEDICINE

## 2024-05-19 NOTE — PROGRESS NOTES
Racquel Romeo presents to the clinic for removal of sutures. The patient has had sutures in place for 17 days. There has been no patient reported signs or symptoms of infection or drainage. 4  sutures are seen and located on the Right palm. Tetanus status is up to date. All sutures were easily removed today. Steri-strips were applied per provider. Routine wound care discussed by the RN or provider. The patient will follow up as needed.    Tony Chavez RN BSN  [facility]

## 2024-06-17 ENCOUNTER — OFFICE VISIT (OUTPATIENT)
Dept: URGENT CARE | Facility: URGENT CARE | Age: 44
End: 2024-06-17
Payer: COMMERCIAL

## 2024-06-17 VITALS
HEART RATE: 80 BPM | DIASTOLIC BLOOD PRESSURE: 72 MMHG | TEMPERATURE: 97.7 F | BODY MASS INDEX: 26.07 KG/M2 | OXYGEN SATURATION: 100 % | WEIGHT: 174 LBS | SYSTOLIC BLOOD PRESSURE: 104 MMHG | RESPIRATION RATE: 14 BRPM

## 2024-06-17 DIAGNOSIS — L08.9 INFECTION OF RIGHT HAND: Primary | ICD-10-CM

## 2024-06-17 PROCEDURE — 87070 CULTURE OTHR SPECIMN AEROBIC: CPT | Performed by: FAMILY MEDICINE

## 2024-06-17 PROCEDURE — 99213 OFFICE O/P EST LOW 20 MIN: CPT | Performed by: FAMILY MEDICINE

## 2024-06-17 PROCEDURE — 87186 SC STD MICRODIL/AGAR DIL: CPT | Performed by: FAMILY MEDICINE

## 2024-06-17 PROCEDURE — 87077 CULTURE AEROBIC IDENTIFY: CPT | Performed by: FAMILY MEDICINE

## 2024-06-17 PROCEDURE — 87205 SMEAR GRAM STAIN: CPT | Performed by: FAMILY MEDICINE

## 2024-06-17 RX ORDER — CEPHALEXIN 500 MG/1
500 CAPSULE ORAL 4 TIMES DAILY
Qty: 40 CAPSULE | Refills: 0 | Status: SHIPPED | OUTPATIENT
Start: 2024-06-17 | End: 2024-06-27

## 2024-06-17 NOTE — PROGRESS NOTES
SUBJECTIVE: Racquel Romeo is a 44 year old female presenting with a chief complaint of rt hand infection.  Onset of symptoms was 1 day(s) ago.      Past Medical History:   Diagnosis Date    Iron deficiency anemia     IUD (intrauterine device) in place     Mirena  4-30-13     Allergies   Allergen Reactions    No Known Drug Allergy      Social History     Tobacco Use    Smoking status: Never    Smokeless tobacco: Never   Substance Use Topics    Alcohol use: No       ROS:  SKIN: no rash  GI: no vomiting    OBJECTIVE:  /72 (BP Location: Left arm, Patient Position: Sitting, Cuff Size: Adult Regular)   Pulse 80   Temp 97.7  F (36.5  C) (Tympanic)   Resp 14   Wt 78.9 kg (174 lb)   SpO2 100%   BMI 26.07 kg/m  GENERAL APPEARANCE: healthy, alert and no distress  NEURO: Normal strength and tone, sensory exam grossly normal,  normal speech and mentation  SKIN: rt lateral jimena with redness tender and 4mm white pustular head, unroofed and opened with #11 blade and cx taken      ICD-10-CM    1. Infection of right hand  L08.9 cephALEXin (KEFLEX) 500 MG capsule     Skin Aerobic Bacterial Culture Routine With Gram Stain          Fluids/Rest, f/u if worse/not any better

## 2024-06-19 LAB
BACTERIA SKIN AEROBE CULT: ABNORMAL
GRAM STAIN RESULT: ABNORMAL
GRAM STAIN RESULT: ABNORMAL

## 2024-06-25 ENCOUNTER — OFFICE VISIT (OUTPATIENT)
Dept: URGENT CARE | Facility: URGENT CARE | Age: 44
End: 2024-06-25
Payer: COMMERCIAL

## 2024-06-25 VITALS
DIASTOLIC BLOOD PRESSURE: 71 MMHG | OXYGEN SATURATION: 99 % | TEMPERATURE: 98.4 F | SYSTOLIC BLOOD PRESSURE: 103 MMHG | WEIGHT: 175 LBS | RESPIRATION RATE: 16 BRPM | HEART RATE: 80 BPM | BODY MASS INDEX: 26.22 KG/M2

## 2024-06-25 DIAGNOSIS — R10.30 LOWER ABDOMINAL PAIN: Primary | ICD-10-CM

## 2024-06-25 LAB
ALBUMIN UR-MCNC: NEGATIVE MG/DL
APPEARANCE UR: ABNORMAL
BACTERIA #/AREA URNS HPF: ABNORMAL /HPF
BASOPHILS # BLD AUTO: 0 10E3/UL (ref 0–0.2)
BASOPHILS NFR BLD AUTO: 0 %
BILIRUB UR QL STRIP: NEGATIVE
C TRACH DNA SPEC QL NAA+PROBE: NEGATIVE
CLUE CELLS: ABNORMAL
COLOR UR AUTO: YELLOW
EOSINOPHIL # BLD AUTO: 0.1 10E3/UL (ref 0–0.7)
EOSINOPHIL NFR BLD AUTO: 1 %
ERYTHROCYTE [DISTWIDTH] IN BLOOD BY AUTOMATED COUNT: 14.6 % (ref 10–15)
GLUCOSE UR STRIP-MCNC: NEGATIVE MG/DL
HCG UR QL: NEGATIVE
HCT VFR BLD AUTO: 36.9 % (ref 35–47)
HGB BLD-MCNC: 11.7 G/DL (ref 11.7–15.7)
HGB UR QL STRIP: ABNORMAL
IMM GRANULOCYTES # BLD: 0 10E3/UL
IMM GRANULOCYTES NFR BLD: 0 %
KETONES UR STRIP-MCNC: NEGATIVE MG/DL
LEUKOCYTE ESTERASE UR QL STRIP: NEGATIVE
LYMPHOCYTES # BLD AUTO: 2.2 10E3/UL (ref 0.8–5.3)
LYMPHOCYTES NFR BLD AUTO: 36 %
MCH RBC QN AUTO: 25.5 PG (ref 26.5–33)
MCHC RBC AUTO-ENTMCNC: 31.7 G/DL (ref 31.5–36.5)
MCV RBC AUTO: 80 FL (ref 78–100)
MONOCYTES # BLD AUTO: 0.5 10E3/UL (ref 0–1.3)
MONOCYTES NFR BLD AUTO: 8 %
MUCOUS THREADS #/AREA URNS LPF: PRESENT /LPF
N GONORRHOEA DNA SPEC QL NAA+PROBE: NEGATIVE
NEUTROPHILS # BLD AUTO: 3.4 10E3/UL (ref 1.6–8.3)
NEUTROPHILS NFR BLD AUTO: 55 %
NITRATE UR QL: NEGATIVE
PH UR STRIP: 5.5 [PH] (ref 5–7)
PLATELET # BLD AUTO: 279 10E3/UL (ref 150–450)
RBC # BLD AUTO: 4.59 10E6/UL (ref 3.8–5.2)
RBC #/AREA URNS AUTO: ABNORMAL /HPF
SP GR UR STRIP: >=1.03 (ref 1–1.03)
SQUAMOUS #/AREA URNS AUTO: ABNORMAL /LPF
TRICHOMONAS, WET PREP: ABNORMAL
UROBILINOGEN UR STRIP-ACNC: 0.2 E.U./DL
WBC # BLD AUTO: 6.1 10E3/UL (ref 4–11)
WBC #/AREA URNS AUTO: ABNORMAL /HPF
WBC'S/HIGH POWER FIELD, WET PREP: ABNORMAL
YEAST, WET PREP: ABNORMAL

## 2024-06-25 PROCEDURE — 81025 URINE PREGNANCY TEST: CPT | Performed by: FAMILY MEDICINE

## 2024-06-25 PROCEDURE — 99214 OFFICE O/P EST MOD 30 MIN: CPT | Performed by: FAMILY MEDICINE

## 2024-06-25 PROCEDURE — 87491 CHLMYD TRACH DNA AMP PROBE: CPT | Performed by: FAMILY MEDICINE

## 2024-06-25 PROCEDURE — 85025 COMPLETE CBC W/AUTO DIFF WBC: CPT | Performed by: FAMILY MEDICINE

## 2024-06-25 PROCEDURE — 87210 SMEAR WET MOUNT SALINE/INK: CPT | Performed by: FAMILY MEDICINE

## 2024-06-25 PROCEDURE — 81001 URINALYSIS AUTO W/SCOPE: CPT | Performed by: FAMILY MEDICINE

## 2024-06-25 PROCEDURE — 87591 N.GONORRHOEAE DNA AMP PROB: CPT | Performed by: FAMILY MEDICINE

## 2024-06-25 PROCEDURE — 36415 COLL VENOUS BLD VENIPUNCTURE: CPT | Performed by: FAMILY MEDICINE

## 2024-06-25 ASSESSMENT — PAIN SCALES - GENERAL: PAINLEVEL: EXTREME PAIN (9)

## 2024-06-25 NOTE — PROGRESS NOTES
"SUBJECTIVE  HPI: Racquel Romeo is a 44 year old female  who presents with the CC of abdominal/pelvic pain.   Pain is located in the suprapubic area, with radiation to None    The pain is characterized as \"pain\".    Pain has been present for 2 week(s) and is fluctuating.     EXACERBATING FACTORS: NEGATIVE.   RELIEVING FACTORS: NEGATIVE.    ASSOCIATED SX: none.     Past Medical History:   Diagnosis Date    Iron deficiency anemia     IUD (intrauterine device) in place     Mirena  4-30-13     Allergies   Allergen Reactions    No Known Drug Allergy      Social History     Tobacco Use    Smoking status: Never    Smokeless tobacco: Never   Substance Use Topics    Alcohol use: No       ROS:CONSTITUTIONAL:NEGATIVE for fever, chills, change in weight    EXAMINATION:  /71 (BP Location: Left arm)   Pulse 80   Temp 98.4  F (36.9  C) (Tympanic)   Resp 16   Wt 79.4 kg (175 lb)   SpO2 99%   BMI 26.22 kg/m  GENERAL APPEARANCE: healthy, alert and no distress  ABDOMEN:  soft, nontender, no HSM or masses and bowel sounds normal      ICD-10-CM    1. Lower abdominal pain  R10.30 UA with Microscopic reflex to Culture     NEISSERIA GONORRHOEA PCR     CHLAMYDIA TRACHOMATIS PCR     HCG Qual, Urine ([id number])     CBC with Platelets & Differential     Wet prep - Clinic Collect     UA Microscopic with Reflex to Culture          F/U PCP/IM/FP, ED if worse    "

## 2025-02-13 ENCOUNTER — APPOINTMENT (OUTPATIENT)
Dept: CT IMAGING | Facility: CLINIC | Age: 45
End: 2025-02-13
Attending: PHYSICIAN ASSISTANT

## 2025-02-13 ENCOUNTER — APPOINTMENT (OUTPATIENT)
Dept: ULTRASOUND IMAGING | Facility: CLINIC | Age: 45
End: 2025-02-13
Attending: PHYSICIAN ASSISTANT

## 2025-02-13 ENCOUNTER — HOSPITAL ENCOUNTER (EMERGENCY)
Facility: CLINIC | Age: 45
Discharge: HOME | End: 2025-02-13
Attending: PHYSICIAN ASSISTANT

## 2025-02-13 VITALS
DIASTOLIC BLOOD PRESSURE: 87 MMHG | SYSTOLIC BLOOD PRESSURE: 114 MMHG | RESPIRATION RATE: 20 BRPM | OXYGEN SATURATION: 93 % | HEART RATE: 87 BPM | TEMPERATURE: 97.7 F

## 2025-02-13 DIAGNOSIS — D25.9 UTERINE LEIOMYOMA, UNSPECIFIED LOCATION: ICD-10-CM

## 2025-02-13 DIAGNOSIS — N93.9 VAGINAL BLEEDING: ICD-10-CM

## 2025-02-13 LAB
ALBUMIN SERPL BCG-MCNC: 3.9 G/DL (ref 3.5–5.2)
ALP SERPL-CCNC: 109 U/L (ref 40–150)
ALT SERPL W P-5'-P-CCNC: 23 U/L (ref 0–50)
ANION GAP SERPL CALCULATED.3IONS-SCNC: 10 MMOL/L (ref 7–15)
AST SERPL W P-5'-P-CCNC: 22 U/L (ref 0–45)
BASE EXCESS BLDV CALC-SCNC: 3 MMOL/L (ref -3–3)
BASOPHILS # BLD AUTO: 0 10E3/UL (ref 0–0.2)
BASOPHILS NFR BLD AUTO: 0 %
BILIRUB DIRECT SERPL-MCNC: <0.2 MG/DL (ref 0–0.3)
BILIRUB SERPL-MCNC: 0.3 MG/DL
BUN SERPL-MCNC: 9.1 MG/DL (ref 6–20)
CALCIUM SERPL-MCNC: 9 MG/DL (ref 8.8–10.4)
CHLORIDE SERPL-SCNC: 100 MMOL/L (ref 98–107)
CREAT SERPL-MCNC: 0.63 MG/DL (ref 0.51–0.95)
EGFRCR SERPLBLD CKD-EPI 2021: >90 ML/MIN/1.73M2
EOSINOPHIL # BLD AUTO: 0.1 10E3/UL (ref 0–0.7)
EOSINOPHIL NFR BLD AUTO: 1 %
ERYTHROCYTE [DISTWIDTH] IN BLOOD BY AUTOMATED COUNT: 12.7 % (ref 10–15)
GLUCOSE SERPL-MCNC: 96 MG/DL (ref 70–99)
HCG SERPL QL: NEGATIVE
HCO3 BLDV-SCNC: 28 MMOL/L (ref 21–28)
HCO3 SERPL-SCNC: 27 MMOL/L (ref 22–29)
HCT VFR BLD AUTO: 36.2 % (ref 35–47)
HGB BLD-MCNC: 11.7 G/DL (ref 11.7–15.7)
IMM GRANULOCYTES # BLD: 0 10E3/UL
IMM GRANULOCYTES NFR BLD: 0 %
LACTATE BLD-SCNC: 0.6 MMOL/L
LYMPHOCYTES # BLD AUTO: 2.3 10E3/UL (ref 0.8–5.3)
LYMPHOCYTES NFR BLD AUTO: 27 %
MCH RBC QN AUTO: 27 PG (ref 26.5–33)
MCHC RBC AUTO-ENTMCNC: 32.3 G/DL (ref 31.5–36.5)
MCV RBC AUTO: 83 FL (ref 78–100)
MONOCYTES # BLD AUTO: 0.7 10E3/UL (ref 0–1.3)
MONOCYTES NFR BLD AUTO: 8 %
NEUTROPHILS # BLD AUTO: 5.3 10E3/UL (ref 1.6–8.3)
NEUTROPHILS NFR BLD AUTO: 63 %
NRBC # BLD AUTO: 0 10E3/UL
NRBC BLD AUTO-RTO: 0 /100
PCO2 BLDV: 45 MM HG (ref 40–50)
PH BLDV: 7.4 [PH] (ref 7.32–7.43)
PLATELET # BLD AUTO: 352 10E3/UL (ref 150–450)
PO2 BLDV: 26 MM HG (ref 25–47)
POTASSIUM SERPL-SCNC: 4.5 MMOL/L (ref 3.4–5.3)
PROT SERPL-MCNC: 7.9 G/DL (ref 6.4–8.3)
RBC # BLD AUTO: 4.34 10E6/UL (ref 3.8–5.2)
SAO2 % BLDV: 47 % (ref 70–75)
SODIUM SERPL-SCNC: 137 MMOL/L (ref 135–145)
WBC # BLD AUTO: 8.4 10E3/UL (ref 4–11)

## 2025-02-13 PROCEDURE — 82803 BLOOD GASES ANY COMBINATION: CPT

## 2025-02-13 PROCEDURE — 93976 VASCULAR STUDY: CPT

## 2025-02-13 PROCEDURE — 84703 CHORIONIC GONADOTROPIN ASSAY: CPT | Performed by: PHYSICIAN ASSISTANT

## 2025-02-13 PROCEDURE — 82248 BILIRUBIN DIRECT: CPT | Performed by: PHYSICIAN ASSISTANT

## 2025-02-13 PROCEDURE — 250N000013 HC RX MED GY IP 250 OP 250 PS 637: Performed by: PHYSICIAN ASSISTANT

## 2025-02-13 PROCEDURE — 74177 CT ABD & PELVIS W/CONTRAST: CPT

## 2025-02-13 PROCEDURE — 99285 EMERGENCY DEPT VISIT HI MDM: CPT | Mod: 25

## 2025-02-13 PROCEDURE — 85025 COMPLETE CBC W/AUTO DIFF WBC: CPT | Performed by: PHYSICIAN ASSISTANT

## 2025-02-13 PROCEDURE — 36415 COLL VENOUS BLD VENIPUNCTURE: CPT | Performed by: PHYSICIAN ASSISTANT

## 2025-02-13 PROCEDURE — 250N000011 HC RX IP 250 OP 636: Performed by: PHYSICIAN ASSISTANT

## 2025-02-13 PROCEDURE — 82565 ASSAY OF CREATININE: CPT | Performed by: PHYSICIAN ASSISTANT

## 2025-02-13 PROCEDURE — 250N000009 HC RX 250: Performed by: PHYSICIAN ASSISTANT

## 2025-02-13 RX ORDER — IBUPROFEN 600 MG/1
600 TABLET, FILM COATED ORAL EVERY 6 HOURS PRN
Qty: 20 TABLET | Refills: 0 | Status: SHIPPED | OUTPATIENT
Start: 2025-02-13

## 2025-02-13 RX ORDER — IBUPROFEN 600 MG/1
600 TABLET, FILM COATED ORAL ONCE
Status: COMPLETED | OUTPATIENT
Start: 2025-02-13 | End: 2025-02-13

## 2025-02-13 RX ORDER — ACETAMINOPHEN 500 MG
1000 TABLET ORAL ONCE
Status: COMPLETED | OUTPATIENT
Start: 2025-02-13 | End: 2025-02-13

## 2025-02-13 RX ORDER — HYDROCODONE BITARTRATE AND ACETAMINOPHEN 5; 325 MG/1; MG/1
1 TABLET ORAL EVERY 6 HOURS PRN
Qty: 6 TABLET | Refills: 0 | Status: SHIPPED | OUTPATIENT
Start: 2025-02-13 | End: 2025-02-16

## 2025-02-13 RX ORDER — IOPAMIDOL 755 MG/ML
88 INJECTION, SOLUTION INTRAVASCULAR ONCE
Status: COMPLETED | OUTPATIENT
Start: 2025-02-13 | End: 2025-02-13

## 2025-02-13 RX ADMIN — SODIUM CHLORIDE 65 ML: 9 INJECTION, SOLUTION INTRAVENOUS at 20:15

## 2025-02-13 RX ADMIN — ACETAMINOPHEN 1000 MG: 500 TABLET, FILM COATED ORAL at 22:13

## 2025-02-13 RX ADMIN — IOPAMIDOL 88 ML: 755 INJECTION, SOLUTION INTRAVENOUS at 20:14

## 2025-02-13 RX ADMIN — IBUPROFEN 600 MG: 600 TABLET ORAL at 23:13

## 2025-02-13 ASSESSMENT — ACTIVITIES OF DAILY LIVING (ADL)
ADLS_ACUITY_SCORE: 41

## 2025-02-13 NOTE — ED TRIAGE NOTES
Generalized lower abdominal pain since Friday, pain with urination. Denies fever, denies N/V/D.     Patient was called in the lobby at 1358 multiple times, patient reports fell asleep in triage lobby.      Triage Assessment (Adult)       Row Name 02/13/25 1749          Triage Assessment    Airway WDL WDL        Cognitive/Neuro/Behavioral WDL    Cognitive/Neuro/Behavioral WDL WDL

## 2025-02-13 NOTE — Clinical Note
Racquel Romeo was seen and treated in our emergency department on 2/13/2025.  She may return to work on 02/17/2025.       If you have any questions or concerns, please don't hesitate to call.      Carmelina Nicole PA-C

## 2025-02-14 NOTE — ED PROVIDER NOTES
Emergency Department Note      History of Present Illness     Chief Complaint   Abdominal Pain      HPI   Racquel Romeo is a 45 year old female who presents with abdominal pain. Patient reports right lower quadrant abdominal pain for past 6 days. Pain initially started on left side and then moved to right lower abdomen. Reports she started her period 6 days ago as well and has a heavier period than normal. She passed a large blood clot today which is abnormal for her. Denies blood thinner use.     Independent Historian   None    Review of External Notes   None    Past Medical History     Medical History and Problem List   Past Medical History:   Diagnosis Date    Iron deficiency anemia     IUD (intrauterine device) in place        Medications   HYDROcodone-acetaminophen (NORCO) 5-325 MG tablet  ibuprofen (ADVIL/MOTRIN) 600 MG tablet        Surgical History   Past Surgical History:   Procedure Laterality Date    NO HISTORY OF SURGERY         Physical Exam     Patient Vitals for the past 24 hrs:   BP Temp Temp src Pulse Resp SpO2   02/13/25 1744 114/87 97.7  F (36.5  C) Temporal 87 20 93 %     Physical Exam  Constitutional: Alert, attentive, GCS 15  HENT:    Nose: Nose normal.    Mouth/Throat: Oropharynx is clear, mucous membranes are moist  Eyes:  EOM are normal. Pupils equal and reactive.  CV:  regular rate and rhythm; no murmurs, rubs or gallups  Chest: Effort normal and breath sounds normal.   GI:   Epigastrium - no tenderness, no guarding   RUQ - no tenderness or Anand's sign   RLQ - tenderness without guarding; positive Rovsing's sign   Suprapubic area - no tenderness, no guarding    LLQ - no tenderness, no guarding   LUQ - no tenderness, no guarding   No distension. Normal bowel sounds  MSK: Normal range of motion.   Neurological: Alert, attentive  Skin: Skin is warm and dry.      Diagnostics     Lab Results   Labs Ordered and Resulted from Time of ED Arrival to Time of ED Departure   ISTAT GASES LACTATE  VENOUS POCT - Abnormal       Result Value    Lactic Acid POCT 0.6      Bicarbonate Venous POCT 28      O2 Sat, Venous POCT 47 (*)     pCO2 Venous POCT 45      pH Venous POCT 7.40      pO2 Venous POCT 26      Base Excess/Deficit (+/-) POCT 3.0     BASIC METABOLIC PANEL - Normal    Sodium 137      Potassium 4.5      Chloride 100      Carbon Dioxide (CO2) 27      Anion Gap 10      Urea Nitrogen 9.1      Creatinine 0.63      GFR Estimate >90      Calcium 9.0      Glucose 96     HEPATIC FUNCTION PANEL - Normal    Protein Total 7.9      Albumin 3.9      Bilirubin Total 0.3      Alkaline Phosphatase 109      AST 22      ALT 23      Bilirubin Direct <0.20     HCG QUALITATIVE PREGNANCY - Normal    hCG Serum Qualitative Negative     CBC WITH PLATELETS AND DIFFERENTIAL    WBC Count 8.4      RBC Count 4.34      Hemoglobin 11.7      Hematocrit 36.2      MCV 83      MCH 27.0      MCHC 32.3      RDW 12.7      Platelet Count 352      % Neutrophils 63      % Lymphocytes 27      % Monocytes 8      % Eosinophils 1      % Basophils 0      % Immature Granulocytes 0      NRBCs per 100 WBC 0      Absolute Neutrophils 5.3      Absolute Lymphocytes 2.3      Absolute Monocytes 0.7      Absolute Eosinophils 0.1      Absolute Basophils 0.0      Absolute Immature Granulocytes 0.0      Absolute NRBCs 0.0         Imaging   US Pelvis Cmplt w Transvag & Doppler LmtPel Duplex Limited   Final Result   IMPRESSION:    1. Three heterogeneously hypoechoic uterine masses, most likely representing fibroids. There is a dominant 6 cm mass without visualized associated blood flow in the central aspect of the posterior right uterine body that is likely within a submucosal    location and corresponds with the mass visualized on the CT scan. No blood flow is visualized in this mass, compatible with a suspected diagnosis of an infarcted uterine fibroid.    2. The endometrial stripe and ovaries are not well-visualized on this study.   3. No free fluid in the  pelvis.      CT Abdomen Pelvis w Contrast   Final Result   IMPRESSION:    1.  6 cm hypoattenuating lesion within the right uterine fundus without enhancement suggesting infarction of intramural fibroid. This could be confirmed with pelvic ultrasound and color Doppler of the uterus. Correlation with lactate levels suggested.          EKG   None    Independent Interpretation   None    ED Course      Medications Administered   Medications   Saline Flush - CT (65 mLs Intravenous $Given 2/13/25 2015)   iopamidol (ISOVUE-370) solution 88 mL (88 mLs Intravenous $Given 2/13/25 2014)   acetaminophen (TYLENOL) tablet 1,000 mg (1,000 mg Oral $Given 2/13/25 2213)   ibuprofen (ADVIL/MOTRIN) tablet 600 mg (600 mg Oral $Given 2/13/25 2313)       Procedures   Procedures     Discussion of Management   OB/GYN, Dr. Ochoa. Discussed patient presentation and imaging findings. Recommendations include pain control and outpatient follow-up with primary care in next 4 weeks.     ED Course        Additional Documentation  None    Medical Decision Making / Diagnosis     CMS Diagnoses: None    MIPS       None    [facility]   Racquel Romeo is a 45 year old female who presents with right pelvic pain and vaginal bleeding for the past 6 days.  Vitals are normal without evidence of hypotension.  Physical exam reveals reproducible right lower quadrant abdominal tenderness without peritoneal signs. Differential includes appendicitis, ectopic pregnancy, miscarriage.  Patient initially declined labs however cautioned her that I am unable to pursue imaging without labs.  Blood work shows no evidence of anemia or leukocytosis. No indication for transfusion today.  Patient declines UA. HCG is negative.  CT scan reveals evidence of an infarcted submucosal uterine fibroid which is also confirmed on ultrasound today.  Several other incidental uterine fibroids noted. This is likely cause of patient's discomfort.  I spoke with on-call Ob/Gyn Dr. Ochoa who  recommends pain control with anti-inflammatories and then outpatient referral for recheck in the next 2 to 4 weeks.  They may pursue outpatient repeat ultrasound as needed.  Results reviewed with patient and sister at bedside.  Questions were answered.  She had adequate pain control with above interventions and will be sent home with medications as below.  Discussed return precautions including increasing bleeding or worsening pain.  Referral was placed for primary care and they may consider consultation with gynecology as needed.  Patient and sister comfortable with plan and patient is discharged home.    Disposition   The patient was discharged.     Diagnosis     ICD-10-CM    1. Uterine leiomyoma, unspecified location  D25.9 Primary Care Referral    suspect infarction      2. Vaginal bleeding  N93.9 Primary Care Referral           Discharge Medications   Discharge Medication List as of 2/13/2025 11:05 PM        START taking these medications    Details   HYDROcodone-acetaminophen (NORCO) 5-325 MG tablet Take 1 tablet by mouth every 6 hours as needed for severe pain., Disp-6 tablet, R-0, Local Print      ibuprofen (ADVIL/MOTRIN) 600 MG tablet Take 1 tablet (600 mg) by mouth every 6 hours as needed for moderate pain., Disp-20 tablet, R-0, Local Print               6:23 PM  February 13, 2025  NAHUN DEAN Emily, PA-C  02/13/25 4242

## 2025-02-14 NOTE — DISCHARGE INSTRUCTIONS
Your imaging shows a uterine fibroid with poor blood flow. This is likely causing your discomfort today. Schedule recheck with primary care in 2-4 weeks and return to ED with any new or worsening symptoms such as pain or increase in vaginal bleeding.

## 2025-03-05 ENCOUNTER — HOSPITAL ENCOUNTER (EMERGENCY)
Facility: CLINIC | Age: 45
Discharge: HOME | End: 2025-03-05
Payer: COMMERCIAL

## 2025-03-05 VITALS
RESPIRATION RATE: 18 BRPM | OXYGEN SATURATION: 98 % | DIASTOLIC BLOOD PRESSURE: 60 MMHG | SYSTOLIC BLOOD PRESSURE: 130 MMHG | TEMPERATURE: 99.4 F | BODY MASS INDEX: 23.52 KG/M2 | WEIGHT: 137 LBS | HEART RATE: 86 BPM

## 2025-03-05 DIAGNOSIS — K21.9 GERD (GASTROESOPHAGEAL REFLUX DISEASE): ICD-10-CM

## 2025-03-05 DIAGNOSIS — R11.0 NAUSEA: ICD-10-CM

## 2025-03-05 DIAGNOSIS — E87.6 HYPOKALEMIA: ICD-10-CM

## 2025-03-05 LAB
ALBUMIN SERPL BCG-MCNC: 3.8 G/DL (ref 3.5–5.2)
ALBUMIN UR-MCNC: NEGATIVE MG/DL
ALP SERPL-CCNC: 115 U/L (ref 40–150)
ALT SERPL W P-5'-P-CCNC: 15 U/L (ref 0–50)
ANION GAP SERPL CALCULATED.3IONS-SCNC: 16 MMOL/L (ref 7–15)
APPEARANCE UR: CLEAR
AST SERPL W P-5'-P-CCNC: 18 U/L (ref 0–45)
BASOPHILS # BLD AUTO: 0.1 10E3/UL (ref 0–0.2)
BASOPHILS NFR BLD AUTO: 1 %
BILIRUB DIRECT SERPL-MCNC: 0.19 MG/DL (ref 0–0.3)
BILIRUB SERPL-MCNC: 0.4 MG/DL
BILIRUB UR QL STRIP: NEGATIVE
BUN SERPL-MCNC: 7.1 MG/DL (ref 6–20)
CALCIUM SERPL-MCNC: 9.2 MG/DL (ref 8.8–10.4)
CHLORIDE SERPL-SCNC: 96 MMOL/L (ref 98–107)
COLOR UR AUTO: NORMAL
CREAT SERPL-MCNC: 0.61 MG/DL (ref 0.51–0.95)
EGFRCR SERPLBLD CKD-EPI 2021: >90 ML/MIN/1.73M2
EOSINOPHIL # BLD AUTO: 0.1 10E3/UL (ref 0–0.7)
EOSINOPHIL NFR BLD AUTO: 1 %
ERYTHROCYTE [DISTWIDTH] IN BLOOD BY AUTOMATED COUNT: 12 % (ref 10–15)
FLUAV RNA SPEC QL NAA+PROBE: NEGATIVE
FLUBV RNA RESP QL NAA+PROBE: NEGATIVE
GLUCOSE SERPL-MCNC: 124 MG/DL (ref 70–99)
GLUCOSE UR STRIP-MCNC: NEGATIVE MG/DL
HCG UR QL: NEGATIVE
HCO3 SERPL-SCNC: 25 MMOL/L (ref 22–29)
HCT VFR BLD AUTO: 34.6 % (ref 35–47)
HGB BLD-MCNC: 11.8 G/DL (ref 11.7–15.7)
HGB UR QL STRIP: NEGATIVE
IMM GRANULOCYTES # BLD: 0.1 10E3/UL
IMM GRANULOCYTES NFR BLD: 1 %
KETONES UR STRIP-MCNC: NEGATIVE MG/DL
LEUKOCYTE ESTERASE UR QL STRIP: NEGATIVE
LIPASE SERPL-CCNC: 32 U/L (ref 13–60)
LYMPHOCYTES # BLD AUTO: 2.2 10E3/UL (ref 0.8–5.3)
LYMPHOCYTES NFR BLD AUTO: 19 %
MAGNESIUM SERPL-MCNC: 2.2 MG/DL (ref 1.7–2.3)
MCH RBC QN AUTO: 28.8 PG (ref 26.5–33)
MCHC RBC AUTO-ENTMCNC: 34.1 G/DL (ref 31.5–36.5)
MCV RBC AUTO: 84 FL (ref 78–100)
MONOCYTES # BLD AUTO: 1.4 10E3/UL (ref 0–1.3)
MONOCYTES NFR BLD AUTO: 12 %
NEUTROPHILS # BLD AUTO: 8 10E3/UL (ref 1.6–8.3)
NEUTROPHILS NFR BLD AUTO: 68 %
NITRATE UR QL: NEGATIVE
NRBC # BLD AUTO: 0 10E3/UL
NRBC BLD AUTO-RTO: 0 /100
PH UR STRIP: 7 [PH] (ref 5–7)
PLATELET # BLD AUTO: 374 10E3/UL (ref 150–450)
POTASSIUM SERPL-SCNC: 3 MMOL/L (ref 3.4–5.3)
PROT SERPL-MCNC: 7.8 G/DL (ref 6.4–8.3)
RBC # BLD AUTO: 4.1 10E6/UL (ref 3.8–5.2)
RBC URINE: <1 /HPF
RSV RNA SPEC NAA+PROBE: NEGATIVE
SARS-COV-2 RNA RESP QL NAA+PROBE: NEGATIVE
SODIUM SERPL-SCNC: 137 MMOL/L (ref 135–145)
SP GR UR STRIP: 1 (ref 1–1.03)
SQUAMOUS EPITHELIAL: 1 /HPF
UROBILINOGEN UR STRIP-MCNC: <2 MG/DL
WBC # BLD AUTO: 11.8 10E3/UL (ref 4–11)
WBC URINE: 1 /HPF

## 2025-03-05 PROCEDURE — 87637 SARSCOV2&INF A&B&RSV AMP PRB: CPT

## 2025-03-05 PROCEDURE — 250N000011 HC RX IP 250 OP 636

## 2025-03-05 PROCEDURE — 96361 HYDRATE IV INFUSION ADD-ON: CPT

## 2025-03-05 PROCEDURE — 81001 URINALYSIS AUTO W/SCOPE: CPT

## 2025-03-05 PROCEDURE — 81003 URINALYSIS AUTO W/O SCOPE: CPT

## 2025-03-05 PROCEDURE — 96374 THER/PROPH/DIAG INJ IV PUSH: CPT

## 2025-03-05 PROCEDURE — 85004 AUTOMATED DIFF WBC COUNT: CPT

## 2025-03-05 PROCEDURE — 81025 URINE PREGNANCY TEST: CPT

## 2025-03-05 PROCEDURE — 83690 ASSAY OF LIPASE: CPT

## 2025-03-05 PROCEDURE — 36415 COLL VENOUS BLD VENIPUNCTURE: CPT

## 2025-03-05 PROCEDURE — 96375 TX/PRO/DX INJ NEW DRUG ADDON: CPT

## 2025-03-05 PROCEDURE — 82248 BILIRUBIN DIRECT: CPT

## 2025-03-05 PROCEDURE — 83735 ASSAY OF MAGNESIUM: CPT

## 2025-03-05 PROCEDURE — 99284 EMERGENCY DEPT VISIT MOD MDM: CPT | Mod: 25

## 2025-03-05 PROCEDURE — 250N000013 HC RX MED GY IP 250 OP 250 PS 637

## 2025-03-05 PROCEDURE — 258N000003 HC RX IP 258 OP 636

## 2025-03-05 RX ORDER — MAGNESIUM HYDROXIDE/ALUMINUM HYDROXICE/SIMETHICONE 120; 1200; 1200 MG/30ML; MG/30ML; MG/30ML
15 SUSPENSION ORAL ONCE
Status: COMPLETED | OUTPATIENT
Start: 2025-03-05 | End: 2025-03-05

## 2025-03-05 RX ORDER — POTASSIUM CHLORIDE 1.5 G/1.58G
40 POWDER, FOR SOLUTION ORAL ONCE
Status: COMPLETED | OUTPATIENT
Start: 2025-03-05 | End: 2025-03-05

## 2025-03-05 RX ORDER — OMEPRAZOLE 20 MG/1
20 CAPSULE, DELAYED RELEASE ORAL DAILY
Qty: 30 CAPSULE | Refills: 0 | Status: SHIPPED | OUTPATIENT
Start: 2025-03-05 | End: 2025-04-04

## 2025-03-05 RX ORDER — FAMOTIDINE 20 MG/1
20 TABLET, FILM COATED ORAL 2 TIMES DAILY
Qty: 60 TABLET | Refills: 0 | Status: SHIPPED | OUTPATIENT
Start: 2025-03-05 | End: 2025-04-04

## 2025-03-05 RX ORDER — ONDANSETRON 4 MG/1
4 TABLET, ORALLY DISINTEGRATING ORAL EVERY 8 HOURS PRN
Qty: 10 TABLET | Refills: 0 | Status: SHIPPED | OUTPATIENT
Start: 2025-03-05 | End: 2025-03-08

## 2025-03-05 RX ORDER — POTASSIUM CHLORIDE 1500 MG/1
20 TABLET, EXTENDED RELEASE ORAL DAILY
Qty: 3 TABLET | Refills: 0 | Status: SHIPPED | OUTPATIENT
Start: 2025-03-05 | End: 2025-03-08

## 2025-03-05 RX ORDER — ONDANSETRON 2 MG/ML
4 INJECTION INTRAMUSCULAR; INTRAVENOUS ONCE
Status: COMPLETED | OUTPATIENT
Start: 2025-03-05 | End: 2025-03-05

## 2025-03-05 RX ADMIN — POTASSIUM CHLORIDE 40 MEQ: 1.5 POWDER, FOR SOLUTION ORAL at 17:01

## 2025-03-05 RX ADMIN — ALUMINUM HYDROXIDE, MAGNESIUM HYDROXIDE, AND SIMETHICONE 15 ML: 1200; 120; 1200 SUSPENSION ORAL at 15:59

## 2025-03-05 RX ADMIN — ONDANSETRON 4 MG: 2 INJECTION, SOLUTION INTRAMUSCULAR; INTRAVENOUS at 15:58

## 2025-03-05 RX ADMIN — SODIUM CHLORIDE 1000 ML: 9 INJECTION, SOLUTION INTRAVENOUS at 17:01

## 2025-03-05 RX ADMIN — FAMOTIDINE 20 MG: 10 INJECTION, SOLUTION INTRAVENOUS at 15:59

## 2025-03-05 ASSESSMENT — ACTIVITIES OF DAILY LIVING (ADL)
ADLS_ACUITY_SCORE: 41

## 2025-03-05 ASSESSMENT — COLUMBIA-SUICIDE SEVERITY RATING SCALE - C-SSRS
6. HAVE YOU EVER DONE ANYTHING, STARTED TO DO ANYTHING, OR PREPARED TO DO ANYTHING TO END YOUR LIFE?: NO
1. IN THE PAST MONTH, HAVE YOU WISHED YOU WERE DEAD OR WISHED YOU COULD GO TO SLEEP AND NOT WAKE UP?: NO
2. HAVE YOU ACTUALLY HAD ANY THOUGHTS OF KILLING YOURSELF IN THE PAST MONTH?: NO

## 2025-03-05 NOTE — ED TRIAGE NOTES
Abd pain with nausea and vomiting and cough 3 weeks.  States she has lost some weight  Leg pain for 2 weeks.  Denies injury     Triage Assessment (Adult)       Row Name 03/05/25 1446          Triage Assessment    Airway WDL WDL        Respiratory WDL    Respiratory WDL cough        Skin Circulation/Temperature WDL    Skin Circulation/Temperature WDL WDL        Cardiac WDL    Cardiac WDL WDL        Peripheral/Neurovascular WDL    Peripheral Neurovascular WDL WDL        Cognitive/Neuro/Behavioral WDL    Cognitive/Neuro/Behavioral WDL WDL

## 2025-03-05 NOTE — ED PROVIDER NOTES
EMERGENCY DEPARTMENT ENCOUNTER      NAME: Angella Huff  AGE: 45 year old female  YOB: 1980  MRN: [id number]  EVALUATION DATE & TIME: No admission date for patient encounter.    PCP: Alberto Arevalo    ED PROVIDER: Rose Urban PA-C      Chief Complaint   Patient presents with    Cough    Abdominal Pain    Leg Pain         FINAL IMPRESSION:  1. GERD (gastroesophageal reflux disease)    2. Nausea    3. Hypokalemia          ED COURSE & MEDICAL DECISION MAKING:    3:36 PM Met with patient for initial interview. Plan for care discussed.  5:39 PM Reevaluated and updated patient. I discussed the plan for discharge with the patient, and patient is agreeable. We discussed supportive cares at home and reasons for return to the ER including new or worsening symptoms. All questions and concerns addressed. Patient to be discharged by RN.    45 year old otherwise healthy female presents to the Emergency Department for evaluation of abdominal pain, acid reflux, nausea, and cough that has been persistent over the last 3 weeks. Patient is accompanied by her  who she elects to interpret for her. Upon exam, patient is afebrile, hemodynamically stable, and in no acute distress. Abdomen soft and non-tender. Differential diagnosis includes but not limited to gastritis, esophagitis, PUD, gallbladder pathology, hepatitis, pancreatitis, pregnancy, IBS, electrolyte abnormality, anemia, dehydration. Discussed option of cardiac/pulmonary work-up work-up including EKG, troponin, and CXR, which patient declines. No associated chest pain, shortness of breath, or exertional symptoms, so low suspicion for ACS. Based on patient's presenting symptoms, laboratories were ordered. Given no associated abdominal pain, using shared decision making, emergent CT abdomen/pelvis deferred at this time.     CBC with mild leukocytosis at 11.8 likely secondary to recent vomiting, no anemia. BMP revealed hypokalemia at 3.0, which was  replaced orally today. Mg WNL. Minimal anion gap at 16 likely secondary to vomiting/starvation given decreased oral intake over the last several days. HFP WNL. Lipase WNL. UA WNL. Serum preg negative.     Patient was treated with IV NS, Zofran, Pepcid, and GI cocktail with improvement in symptoms. Symptoms and workup most consistent with gastritis/GERD. Patient was made aware of the above findings. Plan to discharge patient home with prescription pepcid, omeprazole, and Zofran as well as 3-day course of potassium tablets, especially if patient continues to vomit or have poor PO intake. Discussed strict return precautions and close follow up with their PCP for reevaluation and ongoing management. Discussed could consider H pylori testing and/or endoscopy with GI if symptoms persist. The patient was advised to return to the ER if any new or worsening symptoms develop. The patient verbalizes understanding and agrees with the plan.     Medical Decision Making  Obtained supplemental history:Supplemental history obtained?: Documented in chart and Family Member/Significant Other  Reviewed external records: External records reviewed?: No  Care impacted by chronic illness:Documented in Chart  Did you consider but not order tests?: Work up considered but not performed and documented in chart, if applicable  Did you interpret images independently?: Independent interpretation of ECG and images noted in documentation, when applicable.  Consultation discussion with other provider:Did you involve another provider (consultant, MH, pharmacy, etc.)?: No  Discharge. I prescribed additional prescription strength medication(s) as charted. See documentation for any additional details.    MIPS (CTPE, Dental pain, Ortiz, Sinusitis, Asthma/COPD, Head Trauma): Not Applicable      MEDICATIONS GIVEN IN THE EMERGENCY:  Medications   ondansetron (ZOFRAN) injection 4 mg (4 mg Intravenous $Given 3/5/25 9856)   famotidine (PEPCID) injection 20 mg  (20 mg Intravenous $Given 3/5/25 2418)   alum & mag hydroxide-simethicone (MAALOX) suspension 15 mL (15 mLs Oral $Given 3/5/25 2953)   sodium chloride 0.9% BOLUS 1,000 mL (0 mLs Intravenous Stopped 3/5/25 1079)   potassium chloride (KLOR-CON) Packet 40 mEq (40 mEq Oral $Given 3/5/25 1701)       NEW PRESCRIPTIONS STARTED AT TODAY'S ER VISIT  Discharge Medication List as of 3/5/2025  6:03 PM        START taking these medications    Details   famotidine (PEPCID) 20 MG tablet Take 1 tablet (20 mg) by mouth 2 times daily., Disp-60 tablet, R-0, Local Print      omeprazole (PRILOSEC) 20 MG DR capsule Take 1 capsule (20 mg) by mouth daily., Disp-30 capsule, R-0, Local Print      ondansetron (ZOFRAN ODT) 4 MG ODT tab Take 1 tablet (4 mg) by mouth every 8 hours as needed for nausea., Disp-10 tablet, R-0, Local Print      potassium chloride yoana ER (KLOR-CON M20) 20 MEQ CR tablet Take 1 tablet (20 mEq) by mouth daily for 3 days., Disp-3 tablet, R-0, Local Print                =================================================================    HPI    Patient information was obtained from: patient and patient's     Use of : Yes (In Person) - Language Anjali JACKSON Daria is a 45 year old female who presents to this ED for evaluation of abdominal pain, acid reflux, nausea, and cough that has been persistent over the last 3 weeks. Patient is accompanied by her  who she elects to interpret for her, patient declines . Patient states she has intermittent abdominal pain that feels like cramping. She states she felt like she may have diarrhea, but never actually had any diarrhea. She reports her last BM was earlier today and normal for her. She reports associated nausea and occasional vomiting of what feels like acid reflux/burning sensation. She will vomit up to 3-5 times per day, but some days does not vomit at all. She is accompanied by her  who reports she has had a decreased  appetite over the last several days and has not been eating as much. She denies any hematemesis, melena, or hematochezia. Patient denies any known food triggers. She denies any medications or supplements, caffeine intake, or significant spicy foods. She denies associated chest pain, shortness of breath, exertional symptoms, cardiac or pulmonary history, or family history of early or sudden cardiac disease or death. She denies any urinary symptoms. She also reports a non-productive cough over the last few days. She denies associated URI-like symptoms, fevers or chills, or any other complaints. She denies any similarly ill sick contacts. She has not taken anything for her symptoms.     REVIEW OF SYSTEMS   Review of Systems See HPI    PAST MEDICAL HISTORY:  No past medical history on file.    PAST SURGICAL HISTORY:  No past surgical history on file.        CURRENT MEDICATIONS:    famotidine (PEPCID) 20 MG tablet  omeprazole (PRILOSEC) 20 MG DR capsule  ondansetron (ZOFRAN ODT) 4 MG ODT tab  acetaminophen (TYLENOL) 325 MG tablet  azithromycin (ZITHROMAX) 250 MG tablet  cyclobenzaprine (FLEXERIL) 10 MG tablet  ibuprofen (ADVIL/MOTRIN) 600 MG tablet  potassium chloride yoana ER (KLOR-CON M20) 20 MEQ CR tablet        ALLERGIES:  No Known Allergies    FAMILY HISTORY:  No family history on file.    SOCIAL HISTORY:   Social History     Socioeconomic History    Marital status:    Tobacco Use    Smoking status: Never     Social Drivers of Health     Financial Resource Strain: Medium Risk (2/28/2025)    Received from [facility]    Financial Resource Strain     Difficulty of Paying Living Expenses: 2     Difficulty of Paying Living Expenses: 1   Food Insecurity: No Food Insecurity (2/28/2025)    Received from [facility]    Food Insecurity     Do you worry your food will run out before you are able to buy more?: 1   Transportation Needs: No Transportation  Needs (2/28/2025)    Received from [facility]    Transportation Needs     Does lack of transportation keep you from medical appointments?: 1     Does lack of transportation keep you from work, meetings or getting things that you need?: 1   Social Connections: Socially Integrated (2/28/2025)    Received from [facility]    Social Connections     Do you often feel lonely or isolated from those around you?: 0   Housing Stability: Low Risk  (2/28/2025)    Received from [facility]    Housing Stability     What is your housing situation today?: 1       VITALS:  /60   Pulse 86   Temp 99.4  F (37.4  C) (Oral)   Resp 18   Wt 62.1 kg (137 lb)   SpO2 98%   BMI 23.52 kg/m      PHYSICAL EXAM    Constitutional:  Alert, in no acute distress. Cooperative.  EYES: Conjunctivae clear.   HENT:  Atraumatic, normocephalic.  Respiratory:  Respirations even, unlabored, in no acute respiratory distress. Lungs clear to auscultation bilaterally without wheeze, rhonchi, or rales. No cough. Speaks in full sentences easily.  Cardiovascular:  Regular rate and rhythm, good peripheral perfusion.   GI: Soft, flat, non-distended. Bowel sounds normal. No tenderness to palpation. No guarding, rebound, or other peritoneal signs.  Musculoskeletal:  No edema. No cyanosis. Range of motion major extremities intact.    Integument: Warm, Dry. No rash to visualized skin.   Neurologic:  Alert & oriented. No focal deficits noted.   Psych: Normal mood and affect.      LAB:  All pertinent labs reviewed and interpreted.  Results for orders placed or performed during the hospital encounter of 03/05/25   Hepatic function panel   Result Value Ref Range    Protein Total 7.8 6.4 - 8.3 g/dL    Albumin 3.8 3.5 - 5.2 g/dL    Bilirubin Total 0.4 <=1.2 mg/dL    Alkaline Phosphatase 115 40 - 150 U/L    AST 18 0 - 45 U/L    ALT 15 0 - 50 U/L    Bilirubin Direct 0.19  0.00 - 0.30 mg/dL   Basic metabolic panel   Result Value Ref Range    Sodium 137 135 - 145 mmol/L    Potassium 3.0 (L) 3.4 - 5.3 mmol/L    Chloride 96 (L) 98 - 107 mmol/L    Carbon Dioxide (CO2) 25 22 - 29 mmol/L    Anion Gap 16 (H) 7 - 15 mmol/L    Urea Nitrogen 7.1 6.0 - 20.0 mg/dL    Creatinine 0.61 0.51 - 0.95 mg/dL    GFR Estimate >90 >60 mL/min/1.73m2    Calcium 9.2 8.8 - 10.4 mg/dL    Glucose 124 (H) 70 - 99 mg/dL   Result Value Ref Range    Lipase 32 13 - 60 U/L   UA with Microscopic reflex to Culture    Specimen: Urine, Midstream   Result Value Ref Range    Color Urine Light Yellow Colorless, Straw, Light Yellow, Yellow    Appearance Urine Clear Clear    Glucose Urine Negative Negative mg/dL    Bilirubin Urine Negative Negative    Ketones Urine Negative Negative mg/dL    Specific Gravity Urine 1.005 1.001 - 1.030    Blood Urine Negative Negative    pH Urine 7.0 5.0 - 7.0    Protein Albumin Urine Negative Negative mg/dL    Urobilinogen Urine <2.0 <2.0 mg/dL    Nitrite Urine Negative Negative    Leukocyte Esterase Urine Negative Negative    RBC Urine <1 <=2 /HPF    WBC Urine 1 <=5 /HPF    Squamous Epithelials Urine 1 <=1 /HPF   HCG qualitative urine (UPT)   Result Value Ref Range    hCG Urine Qualitative Negative Negative   Influenza A/B, RSV and SARS-CoV2 PCR (COVID-19) Nasopharyngeal    Specimen: Nasopharyngeal; Swab   Result Value Ref Range    Influenza A PCR Negative Negative    Influenza B PCR Negative Negative    RSV PCR Negative Negative    SARS CoV2 PCR Negative Negative   CBC with platelets and differential   Result Value Ref Range    WBC Count 11.8 (H) 4.0 - 11.0 10e3/uL    RBC Count 4.10 3.80 - 5.20 10e6/uL    Hemoglobin 11.8 11.7 - 15.7 g/dL    Hematocrit 34.6 (L) 35.0 - 47.0 %    MCV 84 78 - 100 fL    MCH 28.8 26.5 - 33.0 pg    MCHC 34.1 31.5 - 36.5 g/dL    RDW 12.0 10.0 - 15.0 %    Platelet Count 374 150 - 450 10e3/uL    % Neutrophils 68 %    % Lymphocytes 19 %    % Monocytes 12 %    %  Eosinophils 1 %    % Basophils 1 %    % Immature Granulocytes 1 %    NRBCs per 100 WBC 0 <1 /100    Absolute Neutrophils 8.0 1.6 - 8.3 10e3/uL    Absolute Lymphocytes 2.2 0.8 - 5.3 10e3/uL    Absolute Monocytes 1.4 (H) 0.0 - 1.3 10e3/uL    Absolute Eosinophils 0.1 0.0 - 0.7 10e3/uL    Absolute Basophils 0.1 0.0 - 0.2 10e3/uL    Absolute Immature Granulocytes 0.1 <=0.4 10e3/uL    Absolute NRBCs 0.0 10e3/uL   Result Value Ref Range    Magnesium 2.2 1.7 - 2.3 mg/dL       RADIOLOGY:  Reviewed all pertinent imaging. Please see official radiology report.  No orders to display     Rose Urban PA-C  [facility] EMERGENCY ROOM  [address] [address] [phone]      Rose Urban PA-C  03/05/25 4019

## 2025-03-05 NOTE — DISCHARGE INSTRUCTIONS
Avoid triggering foods, caffeine, alcohol, tobacco, and NSAIDs. Avoid lying down right after meals and refrain from eating meals within 2-3 hours before bedtime.    I recommend starting a proton-pump inhibitor, omeprazole, that should help prevent acid reflux. It may take up to 2 weeks to notice benefit. For more immediate relief you could take over-the-counter famotidine (Pepcid) twice daily or antacids, like Tums, as needed. You may take Zofran as needed for nausea.    Please start potassium daily over the next 3 days as directed.    Please follow up with your primary care physician for reevaluation and ongoing management. Return to the ER if any new or worsening symptoms including chest pain, shortness of breath, abdominal pain, vomiting, vomiting blood, lightheadedness/fainting, or any other concerning symptoms.     Take Care!  - Rose Urban PA-C

## 2025-03-07 ENCOUNTER — HOSPITAL ENCOUNTER (EMERGENCY)
Facility: CLINIC | Age: 45
Discharge: HOME | End: 2025-03-07
Attending: FAMILY MEDICINE | Admitting: FAMILY MEDICINE
Payer: COMMERCIAL

## 2025-03-07 VITALS
SYSTOLIC BLOOD PRESSURE: 138 MMHG | HEART RATE: 99 BPM | WEIGHT: 140.2 LBS | OXYGEN SATURATION: 98 % | DIASTOLIC BLOOD PRESSURE: 73 MMHG | HEIGHT: 64 IN | BODY MASS INDEX: 23.93 KG/M2 | RESPIRATION RATE: 18 BRPM | TEMPERATURE: 97.9 F

## 2025-03-07 DIAGNOSIS — M79.604 BILATERAL LEG PAIN: ICD-10-CM

## 2025-03-07 DIAGNOSIS — M79.605 BILATERAL LEG PAIN: ICD-10-CM

## 2025-03-07 PROCEDURE — 250N000013 HC RX MED GY IP 250 OP 250 PS 637

## 2025-03-07 PROCEDURE — 99284 EMERGENCY DEPT VISIT MOD MDM: CPT

## 2025-03-07 RX ORDER — ACETAMINOPHEN 500 MG
1000 TABLET ORAL ONCE
Status: COMPLETED | OUTPATIENT
Start: 2025-03-07 | End: 2025-03-07

## 2025-03-07 RX ORDER — METHOCARBAMOL 500 MG/1
1000 TABLET, FILM COATED ORAL ONCE
Status: COMPLETED | OUTPATIENT
Start: 2025-03-07 | End: 2025-03-07

## 2025-03-07 RX ORDER — LIDOCAINE 4 G/G
1 PATCH TOPICAL EVERY 24 HOURS
Qty: 5 PATCH | Refills: 0 | Status: SHIPPED | OUTPATIENT
Start: 2025-03-07

## 2025-03-07 RX ORDER — METHOCARBAMOL 500 MG/1
1000 TABLET, FILM COATED ORAL 4 TIMES DAILY PRN
Qty: 20 TABLET | Refills: 0 | Status: SHIPPED | OUTPATIENT
Start: 2025-03-07

## 2025-03-07 RX ORDER — LIDOCAINE 4 G/G
1 PATCH TOPICAL ONCE
Status: DISCONTINUED | OUTPATIENT
Start: 2025-03-07 | End: 2025-03-07 | Stop reason: HOSPADM

## 2025-03-07 RX ADMIN — ACETAMINOPHEN 1000 MG: 500 TABLET ORAL at 19:48

## 2025-03-07 RX ADMIN — METHOCARBAMOL 1000 MG: 500 TABLET ORAL at 19:49

## 2025-03-07 RX ADMIN — LIDOCAINE 1 PATCH: 246 PATCH TOPICAL at 19:49

## 2025-03-07 ASSESSMENT — COLUMBIA-SUICIDE SEVERITY RATING SCALE - C-SSRS
1. IN THE PAST MONTH, HAVE YOU WISHED YOU WERE DEAD OR WISHED YOU COULD GO TO SLEEP AND NOT WAKE UP?: NO
6. HAVE YOU EVER DONE ANYTHING, STARTED TO DO ANYTHING, OR PREPARED TO DO ANYTHING TO END YOUR LIFE?: NO
2. HAVE YOU ACTUALLY HAD ANY THOUGHTS OF KILLING YOURSELF IN THE PAST MONTH?: NO

## 2025-03-07 ASSESSMENT — ACTIVITIES OF DAILY LIVING (ADL): ADLS_ACUITY_SCORE: 41

## 2025-03-08 NOTE — ED PROVIDER NOTES
EMERGENCY DEPARTMENT ENCOUNTER      NAME: Angella Huff  AGE: 45 year old female  YOB: 1980  MRN: [id number]  EVALUATION DATE & TIME: 3/7/2025  7:10 PM    PCP: Alberto Arevalo    ED PROVIDER: Rose Urban PA-C      Chief Complaint   Patient presents with    Leg Pain         FINAL IMPRESSION:  1. Bilateral leg pain          ED COURSE & MEDICAL DECISION MAKIN:18 PM Met with patient for initial interview. Plan for care discussed.  8:20 PM RN alerted patient feeling improved and requesting discharge home. Reevaluated and updated patient. I discussed the plan for discharge with the patient, and patient is agreeable. We discussed supportive cares at home and reasons for return to the ER including new or worsening symptoms. All questions and concerns addressed. Patient to be discharged by RN.    45 year old female with a history of hypertension who presents to the Emergency Department for evaluation of bilateral leg pain that started a few days ago. Patient is accompanied by her son and daughter who report symptoms are worse at night. She has not taken anything for her symptoms. Per chart review, patient was evaluated by her PCP earlier today and tested positive for H pylori and started on medication management. I actually saw this patient 2 days ago and diagnosed patient with GERD. Upon exam, patient is afebrile, hemodynamically stable, and in no acute distress. Differential diagnosis includes but not limited to muscle spasm, sciatica, restless leg syndrome, electrolyte abnormality, dehydration. Low suspicion for DVT given bilateral nature of symptoms and no associated swelling or provoking factors; therefore, US lower extremities deferred. No preceding trauma or falls; therefore, low suspicion for fracture; therefore XR deferred. Neurovascularly intact. Compartments soft. No associated back pain or signs of cauda equina; therefore, no need for emergent MRI lumbar spine. Low suspicion for  Guillain Strawberry syndrome as no paresthesias or weakness and reflexes intact bilaterally. Discussed option of repeating BMP and Mg to assess for electrolyte abnormalities as this can cause muscle cramping; however, patient declines any lab work or additional investigation today.     Patient was treated with Tylenol, Robaxin, and Lidocaine patch upon arrival with improvement in symptoms and patient requesting discharge home as she feels improved. Plan to discharge patient home with prescription Robaxin and lidocaine patches, strict return precautions, and close follow up with their PCP for reevaluation and ongoing management. The patient was advised to return to the ER if any new or worsening symptoms develop. The patient verbalizes understanding and agrees with the plan.     Medical Decision Making  Obtained supplemental history:Supplemental history obtained?: Documented in chart and Family Member/Significant Other  Reviewed external records: External records reviewed?: Documented in chart  Care impacted by chronic illness:Documented in Chart and Hypertension  Did you consider but not order tests?: Work up considered but not performed and documented in chart, if applicable  Did you interpret images independently?: Independent interpretation of ECG and images noted in documentation, when applicable.  Consultation discussion with other provider:Did you involve another provider (consultant, , pharmacy, etc.)?: No  Discharge. I prescribed additional prescription strength medication(s) as charted. See documentation for any additional details.    MIPS (CTPE, Dental pain, Ortiz, Sinusitis, Asthma/COPD, Head Trauma): Not Applicable      MEDICATIONS GIVEN IN THE EMERGENCY:  Medications   Lidocaine (LIDOCARE) 4 % Patch 1 patch (1 patch Transdermal $Patch/Med Applied 3/7/25 1949)   acetaminophen (TYLENOL) tablet 1,000 mg (1,000 mg Oral $Given 3/7/25 1948)   methocarbamol (ROBAXIN) tablet 1,000 mg (1,000 mg Oral $Given 3/7/25  "1949)       NEW PRESCRIPTIONS STARTED AT TODAY'S ER VISIT  Discharge Medication List as of 3/7/2025  8:32 PM        START taking these medications    Details   Lidocaine (LIDOCARE) 4 % Patch Place 1 patch over 12 hours onto the skin every 24 hours. To prevent lidocaine toxicity, patient should be patch free for 12 hrs daily.Disp-5 patch, R-0Local Print      methocarbamol (ROBAXIN) 500 MG tablet Take 2 tablets (1,000 mg) by mouth 4 times daily as needed for muscle spasms., Disp-20 tablet, R-0, Local Print                =================================================================    HPI    Patient information was obtained from: patient and patient's children    Use of : Yes (Son & daughter - In person) - Language Anjali        Angella Huff is a 45 year old female with a pertinent history of hypertension who presents to this ED by private vehicle with family for evaluation of leg pain.    Patient has been experiencing bilateral leg pain for the past couple days. She describes the \"cramping\" pain in the back of her legs bilaterally from her ankle to her buttocks, and anterodistal on her right leg. She says she has been barely able to stand and walk, and has had trouble sleeping due to pain and discomfort. She has taken Ibuprofen for the pain. Denies arm pain, back pain, chest pain. No fall or injury. Denies fever and chills. Does not want any more lab work done. She came to the ED on 05-Mar-2025 in which she had a positive result for H pylori. She has since been taking potassium pills and prescribed antibiotics. There were no other concerns/complaints at this time.     Per chart review:  - Patient seen at [facility] ED on 05-Mar-2025 for evaluation of abdominal pain, acid reflux, nausea, and cough. Discussed potential diagnosis of gastritis/GERD and consideration of H pylori testing. Noted hypokalemia. Patient started on famotidine, omeprazole, ondansetron, and potassium chloride. Discharged with " recommendation for follow up with clinic.  - Telephone appointment talking about positive result for H pylori on 06-Mar-2025.  - Telephone visit with [facility] on 07-Mar-2025 to discussed lab results. Patient was instructed to stop previous antibiotics and started omeprazole, metronidazole, amoxicillin, and clarithromycin. Informed that she should follow up in 1 month to have a stool test repeated for presence of infection.    REVIEW OF SYSTEMS   Review of Systems See HPI    PAST MEDICAL HISTORY:  History reviewed. No pertinent past medical history.    PAST SURGICAL HISTORY:  History reviewed. No pertinent surgical history.        CURRENT MEDICATIONS:    acetaminophen (TYLENOL) 325 MG tablet  azithromycin (ZITHROMAX) 250 MG tablet  cyclobenzaprine (FLEXERIL) 10 MG tablet  famotidine (PEPCID) 20 MG tablet  ibuprofen (ADVIL/MOTRIN) 600 MG tablet  Lidocaine (LIDOCARE) 4 % Patch  methocarbamol (ROBAXIN) 500 MG tablet  omeprazole (PRILOSEC) 20 MG DR capsule  ondansetron (ZOFRAN ODT) 4 MG ODT tab  potassium chloride yoana ER (KLOR-CON M20) 20 MEQ CR tablet        ALLERGIES:  No Known Allergies    FAMILY HISTORY:  History reviewed. No pertinent family history.    SOCIAL HISTORY:   Social History     Socioeconomic History    Marital status:    Tobacco Use    Smoking status: Never     Social Drivers of Health     Financial Resource Strain: Medium Risk (2/28/2025)    Received from [facility]    Financial Resource Strain     Difficulty of Paying Living Expenses: 2     Difficulty of Paying Living Expenses: 1   Food Insecurity: No Food Insecurity (2/28/2025)    Received from [facility]    Food Insecurity     Do you worry your food will run out before you are able to buy more?: 1   Transportation Needs: No Transportation Needs (2/28/2025)    Received from [facility]    Transportation Needs     Does  "lack of transportation keep you from medical appointments?: 1     Does lack of transportation keep you from work, meetings or getting things that you need?: 1   Social Connections: Socially Integrated (2/28/2025)    Received from [facility]    Social Connections     Do you often feel lonely or isolated from those around you?: 0   Housing Stability: Low Risk  (2/28/2025)    Received from [facility]    Housing Stability     What is your housing situation today?: 1       VITALS:  /73   Pulse 99   Temp 97.9  F (36.6  C) (Oral)   Resp 18   Ht 1.626 m (5' 4\")   Wt 63.6 kg (140 lb 3.2 oz)   SpO2 98%   BMI 24.07 kg/m      PHYSICAL EXAM    Constitutional:  Alert, in no acute distress. Cooperative.  EYES: Conjunctivae clear.  HENT:  Atraumatic, normocephalic.  Respiratory:  Respirations even, unlabored, in no acute respiratory distress.  Cardiovascular:  Regular rate, good peripheral perfusion.  GI: Soft, flat, non-distended.  Musculoskeletal: Bilateral lower extremities: Mild tenderness along anterior right shin without overlying erythema, warmth, purulence, fluctuance, edema, ecchymosis, crepitus, or obvious bony deformity. Mild tenderness to palpation along lateral aspect of left thigh without overlying skin changes or rash. Full ROM without pain. Neurovascularly intact distally. Cap refill <2 seconds. 2+ DP and PT pulses bilaterally. 5/5 strength. Compartments soft. 2+ patellar and Troy's reflexes intact. Toe standing and heel walking intact.   Integument: Warm, Dry.   Neurologic:  Alert & oriented. No focal deficits noted.  Psych: Normal mood and affect.      LAB:  All pertinent labs reviewed and interpreted.       RADIOLOGY:  Reviewed all pertinent imaging. Please see official radiology report.  No orders to display     Amy LIMA, am serving as a scribe to document services personally performed by Rose Urban PA-C based on my " observation and the provider's statements to me. I, Rose Urban PA-C, attest that Amy Buchanan is acting in a scribe capacity, has observed my performance of the services and has documented them in accordance with my direction.    Rose Urban PA-C  [facility] EMERGENCY ROOM  [address] [facility] [address]  [phone]      Rose Urban PA-C  03/07/25 2118

## 2025-03-08 NOTE — DISCHARGE INSTRUCTIONS
Rest, ice/heat, compression with ACE wrap, elevate your extremity, and increase activity as tolerates. You may use topical Icy Hot or Lidoderm as needed. You may use Lidocaine patch as needed - only keep in place for 12 hrs out of every 24 hrs (leaving this in place longer than 12 hours can cause toxicity). You may use Tylenol as needed for pain. You may use Robaxin as needed for muscle spasms.     Tylenol (Acetaminophen) Discharge Instructions:  You may take over-the-counter, Tylenol (acetaminophen) every 4-6 hours as needed for pain.  Take no more than 4000 mg of Tylenol in a 24-hour period for adults, maximum dosing for children is based on weight - please look at medication label.    Avoid taking more than 1 acetaminophen-containing product at a time and be aware that many over-the-counter medications contain a combination of acetaminophen and other products.  If you are taking Tylenol in addition to a combination product please keep track of your daily acetaminophen dose to make sure you do not exceed the recommended 4000 mg.  Taking too much acetaminophen can cause permanent damage to your liver.    Follow-up with your primary care provider for reevaluation and possible additional imaging, joint steroid injection, and/or physical therapy referral.     Return to the emergency department for any new or worsening symptoms including increased pain, redness/warmth/drainage/swelling, fever/chills, new weakness, numbness/tingling, decreased range of motion, cold extremity, or any other concerning symptoms.     Take Care!  - Rose Urban PA-C

## 2025-03-08 NOTE — ED TRIAGE NOTES
Arrives to ED with c/o BLE cramping pain. Seen 2 days ago and noted hypokalemia. Has been taking PO potassium replacement with continued leg cramping pain. Denies CP.      Triage Assessment (Adult)       Row Name 03/07/25 1914          Triage Assessment    Airway WDL WDL        Respiratory WDL    Respiratory WDL WDL        Skin Circulation/Temperature WDL    Skin Circulation/Temperature WDL WDL        Cardiac WDL    Cardiac WDL WDL        Peripheral/Neurovascular WDL    Peripheral Neurovascular WDL WDL        Cognitive/Neuro/Behavioral WDL    Cognitive/Neuro/Behavioral WDL WDL